# Patient Record
Sex: MALE | Race: WHITE | Employment: OTHER | URBAN - NONMETROPOLITAN AREA
[De-identification: names, ages, dates, MRNs, and addresses within clinical notes are randomized per-mention and may not be internally consistent; named-entity substitution may affect disease eponyms.]

---

## 2017-07-05 ENCOUNTER — DOCTOR'S OFFICE (OUTPATIENT)
Dept: URBAN - NONMETROPOLITAN AREA CLINIC 1 | Facility: CLINIC | Age: 82
Setting detail: OPHTHALMOLOGY
End: 2017-07-05
Payer: COMMERCIAL

## 2017-07-05 DIAGNOSIS — G43.801: ICD-10-CM

## 2017-07-05 DIAGNOSIS — H26.491: ICD-10-CM

## 2017-07-05 DIAGNOSIS — H35.3122: ICD-10-CM

## 2017-07-05 DIAGNOSIS — H27.8: ICD-10-CM

## 2017-07-05 DIAGNOSIS — H52.4: ICD-10-CM

## 2017-07-05 DIAGNOSIS — H02.011: ICD-10-CM

## 2017-07-05 DIAGNOSIS — H35.3212: ICD-10-CM

## 2017-07-05 DIAGNOSIS — H40.003: ICD-10-CM

## 2017-07-05 DIAGNOSIS — H43.813: ICD-10-CM

## 2017-07-05 PROCEDURE — VITAEYE VITA EYE VITAMINS: Performed by: OPHTHALMOLOGY

## 2017-07-05 PROCEDURE — 92014 COMPRE OPH EXAM EST PT 1/>: CPT | Performed by: OPHTHALMOLOGY

## 2017-07-05 PROCEDURE — 92134 CPTRZ OPH DX IMG PST SGM RTA: CPT | Performed by: OPHTHALMOLOGY

## 2017-07-05 ASSESSMENT — VISUAL ACUITY
OD_BCVA: 20/20-2
OS_BCVA: 20/25

## 2017-07-05 ASSESSMENT — REFRACTION_CURRENTRX
OD_OVR_VA: 20/
OD_OVR_VA: 20/
OS_OVR_VA: 20/
OS_OVR_VA: 20/
OD_OVR_VA: 20/
OS_OVR_VA: 20/

## 2017-07-05 ASSESSMENT — REFRACTION_MANIFEST
OD_VA3: 20/
OD_VA2: 20/
OS_VA3: 20/
OS_VA2: 20/
OD_VA1: 20/
OD_VA2: 20/
OD_VA2: 20/
OS_VA1: 20/
OD_VA3: 20/
OU_VA: 20/
OD_VA1: 20/
OS_VA1: 20/
OD_VA3: 20/
OU_VA: 20/
OS_VA3: 20/
OD_VA1: 20/
OS_VA1: 20/
OS_VA2: 20/
OS_VA3: 20/
OS_VA2: 20/
OU_VA: 20/

## 2017-07-05 ASSESSMENT — SPHEQUIV_DERIVED
OD_SPHEQUIV: 0.25
OS_SPHEQUIV: 0.125

## 2017-07-05 ASSESSMENT — LID POSITION - DERMATOCHALASIS
OS_DERMATOCHALASIS: +1
OD_DERMATOCHALASIS: +1

## 2017-07-05 ASSESSMENT — REFRACTION_AUTOREFRACTION
OS_SPHERE: +0.50
OD_CYLINDER: -1.50
OS_CYLINDER: -0.75
OS_AXIS: 71
OD_SPHERE: +1.00
OD_AXIS: 68

## 2017-07-05 ASSESSMENT — CONFRONTATIONAL VISUAL FIELD TEST (CVF)
OS_FINDINGS: FULL
OD_FINDINGS: FULL

## 2017-10-13 ENCOUNTER — DOCTOR'S OFFICE (OUTPATIENT)
Dept: URBAN - NONMETROPOLITAN AREA CLINIC 1 | Facility: CLINIC | Age: 82
Setting detail: OPHTHALMOLOGY
End: 2017-10-13
Payer: COMMERCIAL

## 2017-10-13 DIAGNOSIS — H27.8: ICD-10-CM

## 2017-10-13 DIAGNOSIS — H43.813: ICD-10-CM

## 2017-10-13 DIAGNOSIS — H35.3212: ICD-10-CM

## 2017-10-13 DIAGNOSIS — H35.3122: ICD-10-CM

## 2017-10-13 DIAGNOSIS — G43.801: ICD-10-CM

## 2017-10-13 DIAGNOSIS — H26.491: ICD-10-CM

## 2017-10-13 DIAGNOSIS — H40.003: ICD-10-CM

## 2017-10-13 PROCEDURE — 92014 COMPRE OPH EXAM EST PT 1/>: CPT | Performed by: OPHTHALMOLOGY

## 2017-10-13 PROCEDURE — 92250 FUNDUS PHOTOGRAPHY W/I&R: CPT | Performed by: OPHTHALMOLOGY

## 2017-10-13 PROCEDURE — 92134 CPTRZ OPH DX IMG PST SGM RTA: CPT | Performed by: OPHTHALMOLOGY

## 2017-10-13 ASSESSMENT — REFRACTION_MANIFEST
OU_VA: 20/
OS_VA3: 20/
OD_VA1: 20/
OS_VA1: 20/
OS_VA3: 20/
OU_VA: 20/
OD_VA3: 20/
OS_VA1: 20/
OD_VA1: 20/
OD_VA2: 20/
OU_VA: 20/
OD_VA3: 20/
OD_VA2: 20/
OS_VA2: 20/
OS_VA2: 20/
OS_VA3: 20/
OD_VA2: 20/
OS_VA1: 20/
OD_VA1: 20/
OD_VA3: 20/
OS_VA2: 20/

## 2017-10-13 ASSESSMENT — REFRACTION_CURRENTRX
OD_OVR_VA: 20/
OD_OVR_VA: 20/
OS_OVR_VA: 20/
OD_OVR_VA: 20/
OS_OVR_VA: 20/
OS_OVR_VA: 20/

## 2017-10-13 ASSESSMENT — VISUAL ACUITY
OS_BCVA: 20/70
OD_BCVA: 20/25

## 2017-10-13 ASSESSMENT — REFRACTION_AUTOREFRACTION
OS_SPHERE: +0.50
OD_AXIS: 68
OD_CYLINDER: -1.50
OS_CYLINDER: -0.75
OS_AXIS: 71
OD_SPHERE: +1.00

## 2017-10-13 ASSESSMENT — SPHEQUIV_DERIVED
OD_SPHEQUIV: 0.25
OS_SPHEQUIV: 0.125

## 2017-10-13 ASSESSMENT — LID POSITION - DERMATOCHALASIS
OS_DERMATOCHALASIS: +1
OD_DERMATOCHALASIS: +1

## 2017-10-13 ASSESSMENT — CONFRONTATIONAL VISUAL FIELD TEST (CVF)
OD_FINDINGS: FULL
OS_FINDINGS: FULL

## 2017-10-16 ENCOUNTER — DOCTOR'S OFFICE (OUTPATIENT)
Dept: URBAN - METROPOLITAN AREA CLINIC 136 | Facility: CLINIC | Age: 82
Setting detail: OPHTHALMOLOGY
End: 2017-10-16
Payer: COMMERCIAL

## 2017-10-16 DIAGNOSIS — Z96.1: ICD-10-CM

## 2017-10-16 DIAGNOSIS — H43.813: ICD-10-CM

## 2017-10-16 DIAGNOSIS — H35.3122: ICD-10-CM

## 2017-10-16 DIAGNOSIS — H35.3211: ICD-10-CM

## 2017-10-16 DIAGNOSIS — H40.003: ICD-10-CM

## 2017-10-16 DIAGNOSIS — H26.491: ICD-10-CM

## 2017-10-16 DIAGNOSIS — G43.801: ICD-10-CM

## 2017-10-16 PROCEDURE — 67027 IMPLANT EYE DRUG SYSTEM: CPT | Performed by: OPHTHALMOLOGY

## 2017-10-16 PROCEDURE — 92134 CPTRZ OPH DX IMG PST SGM RTA: CPT | Performed by: OPHTHALMOLOGY

## 2017-10-16 PROCEDURE — 67110 REPAIR DETACHED RETINA: CPT | Performed by: OPHTHALMOLOGY

## 2017-10-16 PROCEDURE — SPECPHARM SPECIALTY PHARMACY DRUG: Performed by: OPHTHALMOLOGY

## 2017-10-16 PROCEDURE — 99214 OFFICE O/P EST MOD 30 MIN: CPT | Performed by: OPHTHALMOLOGY

## 2017-10-16 ASSESSMENT — CONFRONTATIONAL VISUAL FIELD TEST (CVF)
OD_FINDINGS: FULL
OS_FINDINGS: FULL

## 2017-10-16 ASSESSMENT — LID POSITION - DERMATOCHALASIS
OD_DERMATOCHALASIS: +1
OS_DERMATOCHALASIS: +1

## 2017-10-24 ENCOUNTER — RX ONLY (RX ONLY)
Age: 82
End: 2017-10-24

## 2017-10-24 ENCOUNTER — DOCTOR'S OFFICE (OUTPATIENT)
Dept: URBAN - METROPOLITAN AREA CLINIC 136 | Facility: CLINIC | Age: 82
Setting detail: OPHTHALMOLOGY
End: 2017-10-24
Payer: COMMERCIAL

## 2017-10-24 DIAGNOSIS — H35.3211: ICD-10-CM

## 2017-10-24 DIAGNOSIS — H40.003: ICD-10-CM

## 2017-10-24 DIAGNOSIS — H35.3122: ICD-10-CM

## 2017-10-24 DIAGNOSIS — H43.813: ICD-10-CM

## 2017-10-24 DIAGNOSIS — Z96.1: ICD-10-CM

## 2017-10-24 DIAGNOSIS — H26.491: ICD-10-CM

## 2017-10-24 DIAGNOSIS — G43.801: ICD-10-CM

## 2017-10-24 PROCEDURE — 99024 POSTOP FOLLOW-UP VISIT: CPT | Performed by: OPHTHALMOLOGY

## 2017-10-24 PROCEDURE — 92134 CPTRZ OPH DX IMG PST SGM RTA: CPT | Performed by: OPHTHALMOLOGY

## 2017-10-24 PROCEDURE — 92250 FUNDUS PHOTOGRAPHY W/I&R: CPT | Performed by: OPHTHALMOLOGY

## 2017-10-24 ASSESSMENT — REFRACTION_CURRENTRX
OD_OVR_VA: 20/
OS_OVR_VA: 20/
OS_OVR_VA: 20/
OD_OVR_VA: 20/
OD_OVR_VA: 20/
OS_OVR_VA: 20/

## 2017-10-24 ASSESSMENT — SPHEQUIV_DERIVED
OD_SPHEQUIV: 0.25
OS_SPHEQUIV: 0.125

## 2017-10-24 ASSESSMENT — CONFRONTATIONAL VISUAL FIELD TEST (CVF)
OS_FINDINGS: FULL
OD_FINDINGS: FULL

## 2017-10-24 ASSESSMENT — REFRACTION_AUTOREFRACTION
OS_SPHERE: +0.50
OD_CYLINDER: -1.50
OD_AXIS: 68
OS_AXIS: 71
OS_CYLINDER: -0.75
OD_SPHERE: +1.00

## 2017-10-24 ASSESSMENT — REFRACTION_MANIFEST
OS_VA2: 20/
OD_VA1: 20/
OU_VA: 20/
OU_VA: 20/
OS_VA3: 20/
OD_VA3: 20/
OD_VA2: 20/
OD_VA1: 20/
OS_VA2: 20/
OS_VA1: 20/
OS_VA3: 20/
OS_VA2: 20/
OD_VA2: 20/
OS_VA3: 20/
OD_VA3: 20/
OD_VA1: 20/
OD_VA2: 20/
OU_VA: 20/
OD_VA3: 20/
OS_VA1: 20/
OS_VA1: 20/

## 2017-10-24 ASSESSMENT — VISUAL ACUITY
OD_BCVA: 20/22-1
OS_BCVA: 20/300

## 2017-10-24 ASSESSMENT — LID POSITION - DERMATOCHALASIS
OD_DERMATOCHALASIS: +1
OS_DERMATOCHALASIS: +1

## 2017-10-26 ASSESSMENT — REFRACTION_MANIFEST
OD_VA3: 20/
OD_VA2: 20/
OS_VA2: 20/
OD_VA2: 20/
OD_VA1: 20/
OS_VA2: 20/
OD_VA1: 20/
OS_VA3: 20/
OD_VA3: 20/
OS_VA2: 20/
OU_VA: 20/
OU_VA: 20/
OD_VA2: 20/
OS_VA1: 20/
OU_VA: 20/
OS_VA1: 20/
OS_VA1: 20/
OD_VA1: 20/
OS_VA3: 20/
OS_VA3: 20/
OD_VA3: 20/

## 2017-10-26 ASSESSMENT — SPHEQUIV_DERIVED
OD_SPHEQUIV: 0.25
OS_SPHEQUIV: 0.125

## 2017-10-26 ASSESSMENT — REFRACTION_AUTOREFRACTION
OS_SPHERE: +0.50
OS_CYLINDER: -0.75
OD_AXIS: 68
OD_CYLINDER: -1.50
OS_AXIS: 71
OD_SPHERE: +1.00

## 2017-10-26 ASSESSMENT — VISUAL ACUITY
OD_BCVA: 20/40-
OS_BCVA: 20/500

## 2017-10-26 ASSESSMENT — REFRACTION_CURRENTRX
OS_OVR_VA: 20/
OS_OVR_VA: 20/
OD_OVR_VA: 20/
OS_OVR_VA: 20/
OD_OVR_VA: 20/
OD_OVR_VA: 20/

## 2017-10-31 ENCOUNTER — DOCTOR'S OFFICE (OUTPATIENT)
Dept: URBAN - METROPOLITAN AREA CLINIC 136 | Facility: CLINIC | Age: 82
Setting detail: OPHTHALMOLOGY
End: 2017-10-31
Payer: COMMERCIAL

## 2017-10-31 DIAGNOSIS — H35.3211: ICD-10-CM

## 2017-10-31 DIAGNOSIS — H35.3122: ICD-10-CM

## 2017-10-31 DIAGNOSIS — Z96.1: ICD-10-CM

## 2017-10-31 DIAGNOSIS — H26.491: ICD-10-CM

## 2017-10-31 DIAGNOSIS — H43.813: ICD-10-CM

## 2017-10-31 DIAGNOSIS — H40.003: ICD-10-CM

## 2017-10-31 PROCEDURE — 99024 POSTOP FOLLOW-UP VISIT: CPT | Performed by: OPHTHALMOLOGY

## 2017-10-31 PROCEDURE — 92134 CPTRZ OPH DX IMG PST SGM RTA: CPT | Performed by: OPHTHALMOLOGY

## 2017-10-31 ASSESSMENT — REFRACTION_MANIFEST
OU_VA: 20/
OD_VA2: 20/
OU_VA: 20/
OS_VA1: 20/
OD_VA2: 20/
OS_VA1: 20/
OD_VA2: 20/
OU_VA: 20/
OD_VA3: 20/
OD_VA3: 20/
OS_VA2: 20/
OS_VA3: 20/
OD_VA1: 20/
OS_VA2: 20/
OS_VA3: 20/
OD_VA1: 20/
OD_VA3: 20/
OS_VA2: 20/
OS_VA1: 20/
OD_VA1: 20/
OS_VA3: 20/

## 2017-10-31 ASSESSMENT — SPHEQUIV_DERIVED
OD_SPHEQUIV: 0.25
OS_SPHEQUIV: 0.125

## 2017-10-31 ASSESSMENT — REFRACTION_CURRENTRX
OS_OVR_VA: 20/
OS_OVR_VA: 20/
OD_OVR_VA: 20/
OD_OVR_VA: 20/
OS_OVR_VA: 20/
OD_OVR_VA: 20/

## 2017-10-31 ASSESSMENT — VISUAL ACUITY
OS_BCVA: 20/500
OD_BCVA: 20/40

## 2017-10-31 ASSESSMENT — LID POSITION - DERMATOCHALASIS
OD_DERMATOCHALASIS: +1
OS_DERMATOCHALASIS: +1

## 2017-10-31 ASSESSMENT — CONFRONTATIONAL VISUAL FIELD TEST (CVF)
OD_FINDINGS: FULL
OS_FINDINGS: FULL

## 2017-10-31 ASSESSMENT — REFRACTION_AUTOREFRACTION
OD_AXIS: 68
OD_CYLINDER: -1.50
OS_AXIS: 71
OS_SPHERE: +0.50
OD_SPHERE: +1.00
OS_CYLINDER: -0.75

## 2017-10-31 ASSESSMENT — CORNEAL EDEMA CLINICAL DESCRIPTION: OD_CORNEALEDEMA: T

## 2017-11-16 ENCOUNTER — DOCTOR'S OFFICE (OUTPATIENT)
Dept: URBAN - METROPOLITAN AREA CLINIC 136 | Facility: CLINIC | Age: 82
Setting detail: OPHTHALMOLOGY
End: 2017-11-16
Payer: COMMERCIAL

## 2017-11-16 DIAGNOSIS — Z96.1: ICD-10-CM

## 2017-11-16 DIAGNOSIS — H40.003: ICD-10-CM

## 2017-11-16 DIAGNOSIS — H43.813: ICD-10-CM

## 2017-11-16 DIAGNOSIS — H35.3211: ICD-10-CM

## 2017-11-16 DIAGNOSIS — H35.3122: ICD-10-CM

## 2017-11-16 DIAGNOSIS — H26.491: ICD-10-CM

## 2017-11-16 PROCEDURE — 92134 CPTRZ OPH DX IMG PST SGM RTA: CPT | Performed by: OPHTHALMOLOGY

## 2017-11-16 PROCEDURE — 67028 INJECTION EYE DRUG: CPT | Performed by: OPHTHALMOLOGY

## 2017-11-16 PROCEDURE — 99024 POSTOP FOLLOW-UP VISIT: CPT | Performed by: OPHTHALMOLOGY

## 2017-11-16 ASSESSMENT — CONFRONTATIONAL VISUAL FIELD TEST (CVF)
OS_FINDINGS: FULL
OD_FINDINGS: FULL

## 2017-11-16 ASSESSMENT — LID POSITION - DERMATOCHALASIS
OD_DERMATOCHALASIS: +1
OS_DERMATOCHALASIS: +1

## 2017-11-16 ASSESSMENT — CORNEAL EDEMA CLINICAL DESCRIPTION: OD_CORNEALEDEMA: T

## 2017-11-17 ENCOUNTER — RX ONLY (RX ONLY)
Age: 82
End: 2017-11-17

## 2017-11-17 ASSESSMENT — REFRACTION_CURRENTRX
OS_OVR_VA: 20/
OD_OVR_VA: 20/
OS_OVR_VA: 20/
OS_OVR_VA: 20/
OD_OVR_VA: 20/
OD_OVR_VA: 20/

## 2017-11-17 ASSESSMENT — SPHEQUIV_DERIVED
OS_SPHEQUIV: 0.125
OD_SPHEQUIV: 0.25

## 2017-11-17 ASSESSMENT — REFRACTION_MANIFEST
OU_VA: 20/
OD_VA2: 20/
OD_VA3: 20/
OS_VA3: 20/
OS_VA2: 20/
OS_VA1: 20/
OD_VA2: 20/
OS_VA1: 20/
OS_VA2: 20/
OS_VA3: 20/
OD_VA3: 20/
OD_VA1: 20/
OD_VA2: 20/
OS_VA2: 20/
OU_VA: 20/
OD_VA1: 20/
OU_VA: 20/
OD_VA3: 20/
OS_VA3: 20/
OS_VA1: 20/
OD_VA1: 20/

## 2017-11-17 ASSESSMENT — REFRACTION_AUTOREFRACTION
OD_SPHERE: +1.00
OS_CYLINDER: -0.75
OS_AXIS: 71
OD_AXIS: 68
OS_SPHERE: +0.50
OD_CYLINDER: -1.50

## 2017-11-17 ASSESSMENT — VISUAL ACUITY
OS_BCVA: 20/400
OD_BCVA: 20/40

## 2018-01-11 ENCOUNTER — DOCTOR'S OFFICE (OUTPATIENT)
Dept: URBAN - NONMETROPOLITAN AREA CLINIC 1 | Facility: CLINIC | Age: 83
Setting detail: OPHTHALMOLOGY
End: 2018-01-11
Payer: COMMERCIAL

## 2018-01-11 DIAGNOSIS — H43.813: ICD-10-CM

## 2018-01-11 DIAGNOSIS — H43.811: ICD-10-CM

## 2018-01-11 DIAGNOSIS — H43.812: ICD-10-CM

## 2018-01-11 DIAGNOSIS — H35.3122: ICD-10-CM

## 2018-01-11 DIAGNOSIS — H40.003: ICD-10-CM

## 2018-01-11 DIAGNOSIS — H26.491: ICD-10-CM

## 2018-01-11 DIAGNOSIS — Z96.1: ICD-10-CM

## 2018-01-11 DIAGNOSIS — H35.3211: ICD-10-CM

## 2018-01-11 PROCEDURE — 92250 FUNDUS PHOTOGRAPHY W/I&R: CPT | Performed by: OPHTHALMOLOGY

## 2018-01-11 PROCEDURE — 92235 FLUORESCEIN ANGRPH MLTIFRAME: CPT | Performed by: OPHTHALMOLOGY

## 2018-01-11 PROCEDURE — 92014 COMPRE OPH EXAM EST PT 1/>: CPT | Performed by: OPHTHALMOLOGY

## 2018-01-11 PROCEDURE — 92225 OPHTHALMOSCOPY EXTENDED INITIAL: CPT | Performed by: OPHTHALMOLOGY

## 2018-01-11 ASSESSMENT — REFRACTION_MANIFEST
OS_VA1: 20/
OD_VA3: 20/
OD_VA1: 20/
OD_VA1: 20/
OU_VA: 20/
OD_VA1: 20/
OS_VA3: 20/
OS_VA2: 20/
OU_VA: 20/
OS_VA1: 20/
OU_VA: 20/
OD_VA3: 20/
OD_VA2: 20/
OD_VA3: 20/
OS_VA3: 20/
OS_VA2: 20/
OS_VA1: 20/
OD_VA2: 20/
OS_VA3: 20/
OS_VA2: 20/
OD_VA2: 20/

## 2018-01-11 ASSESSMENT — SPHEQUIV_DERIVED
OD_SPHEQUIV: 0.25
OS_SPHEQUIV: 0.125

## 2018-01-11 ASSESSMENT — REFRACTION_CURRENTRX
OD_OVR_VA: 20/
OS_OVR_VA: 20/
OD_OVR_VA: 20/
OD_OVR_VA: 20/

## 2018-01-11 ASSESSMENT — CONFRONTATIONAL VISUAL FIELD TEST (CVF)
OS_FINDINGS: FULL
OD_FINDINGS: FULL

## 2018-01-11 ASSESSMENT — REFRACTION_AUTOREFRACTION
OD_SPHERE: +1.00
OD_AXIS: 68
OD_CYLINDER: -1.50
OS_CYLINDER: -0.75
OS_AXIS: 71
OS_SPHERE: +0.50

## 2018-01-11 ASSESSMENT — VISUAL ACUITY
OD_BCVA: 20/40-2
OS_BCVA: 20/70-2

## 2018-01-11 ASSESSMENT — LID POSITION - DERMATOCHALASIS
OS_DERMATOCHALASIS: +1
OD_DERMATOCHALASIS: +1

## 2018-01-11 ASSESSMENT — CORNEAL EDEMA CLINICAL DESCRIPTION: OD_CORNEALEDEMA: T

## 2018-01-18 ENCOUNTER — DOCTOR'S OFFICE (OUTPATIENT)
Dept: URBAN - NONMETROPOLITAN AREA CLINIC 1 | Facility: CLINIC | Age: 83
Setting detail: OPHTHALMOLOGY
End: 2018-01-18
Payer: COMMERCIAL

## 2018-01-18 DIAGNOSIS — H35.3211: ICD-10-CM

## 2018-01-18 DIAGNOSIS — H35.3122: ICD-10-CM

## 2018-01-18 PROCEDURE — 92240 ICG ANGIOGRAPHY I&R UNI/BI: CPT | Performed by: OPHTHALMOLOGY

## 2018-01-18 PROCEDURE — 67028 INJECTION EYE DRUG: CPT | Performed by: OPHTHALMOLOGY

## 2018-01-18 ASSESSMENT — REFRACTION_AUTOREFRACTION
OS_SPHERE: +0.50
OS_AXIS: 71
OD_AXIS: 68
OD_CYLINDER: -1.50
OS_CYLINDER: -0.75
OD_SPHERE: +1.00

## 2018-01-18 ASSESSMENT — SPHEQUIV_DERIVED
OD_SPHEQUIV: 0.25
OS_SPHEQUIV: 0.125

## 2018-01-18 ASSESSMENT — VISUAL ACUITY
OS_BCVA: 20/70-2
OD_BCVA: 20/40-2

## 2018-02-19 ENCOUNTER — DOCTOR'S OFFICE (OUTPATIENT)
Dept: URBAN - NONMETROPOLITAN AREA CLINIC 1 | Facility: CLINIC | Age: 83
Setting detail: OPHTHALMOLOGY
End: 2018-02-19
Payer: COMMERCIAL

## 2018-02-19 DIAGNOSIS — H35.3211: ICD-10-CM

## 2018-02-19 DIAGNOSIS — H40.003: ICD-10-CM

## 2018-02-19 DIAGNOSIS — H26.491: ICD-10-CM

## 2018-02-19 DIAGNOSIS — H43.811: ICD-10-CM

## 2018-02-19 DIAGNOSIS — H35.3122: ICD-10-CM

## 2018-02-19 DIAGNOSIS — H43.813: ICD-10-CM

## 2018-02-19 DIAGNOSIS — Z96.1: ICD-10-CM

## 2018-02-19 DIAGNOSIS — H43.812: ICD-10-CM

## 2018-02-19 PROCEDURE — 92014 COMPRE OPH EXAM EST PT 1/>: CPT | Performed by: OPHTHALMOLOGY

## 2018-02-19 PROCEDURE — 92134 CPTRZ OPH DX IMG PST SGM RTA: CPT | Performed by: OPHTHALMOLOGY

## 2018-02-19 PROCEDURE — 92226 OPHTHALMOSCOPY EXT SUBSEQUENT: CPT | Performed by: OPHTHALMOLOGY

## 2018-02-19 ASSESSMENT — REFRACTION_MANIFEST
OS_VA1: 20/
OD_VA3: 20/
OD_VA1: 20/
OS_VA3: 20/
OS_VA1: 20/
OS_VA2: 20/
OS_VA3: 20/
OD_VA3: 20/
OS_VA1: 20/
OD_VA1: 20/
OS_VA2: 20/
OS_VA2: 20/
OS_VA3: 20/
OD_VA2: 20/
OD_VA2: 20/
OU_VA: 20/
OD_VA2: 20/
OU_VA: 20/
OD_VA1: 20/
OD_VA3: 20/
OU_VA: 20/

## 2018-02-19 ASSESSMENT — LID POSITION - DERMATOCHALASIS
OD_DERMATOCHALASIS: +1
OS_DERMATOCHALASIS: +1

## 2018-02-19 ASSESSMENT — REFRACTION_CURRENTRX
OD_OVR_VA: 20/
OS_OVR_VA: 20/

## 2018-02-19 ASSESSMENT — SPHEQUIV_DERIVED
OD_SPHEQUIV: 0.25
OS_SPHEQUIV: 0.125

## 2018-02-19 ASSESSMENT — REFRACTION_AUTOREFRACTION
OD_SPHERE: +1.00
OD_AXIS: 68
OD_CYLINDER: -1.50
OS_SPHERE: +0.50
OS_CYLINDER: -0.75
OS_AXIS: 71

## 2018-02-19 ASSESSMENT — VISUAL ACUITY
OS_BCVA: 20/70-1
OD_BCVA: 20/25-2

## 2018-02-19 ASSESSMENT — CORNEAL EDEMA CLINICAL DESCRIPTION: OD_CORNEALEDEMA: T

## 2018-02-19 ASSESSMENT — CONFRONTATIONAL VISUAL FIELD TEST (CVF)
OS_FINDINGS: FULL
OD_FINDINGS: FULL

## 2018-02-27 ENCOUNTER — DOCTOR'S OFFICE (OUTPATIENT)
Dept: URBAN - NONMETROPOLITAN AREA CLINIC 1 | Facility: CLINIC | Age: 83
Setting detail: OPHTHALMOLOGY
End: 2018-02-27
Payer: COMMERCIAL

## 2018-02-27 DIAGNOSIS — H35.3211: ICD-10-CM

## 2018-02-27 PROCEDURE — 67028 INJECTION EYE DRUG: CPT | Performed by: OPHTHALMOLOGY

## 2018-03-09 ASSESSMENT — SPHEQUIV_DERIVED
OS_SPHEQUIV: 0.125
OD_SPHEQUIV: 0.25

## 2018-03-09 ASSESSMENT — REFRACTION_AUTOREFRACTION
OD_SPHERE: +1.00
OS_AXIS: 71
OD_AXIS: 68
OS_CYLINDER: -0.75
OD_CYLINDER: -1.50
OS_SPHERE: +0.50

## 2018-03-09 ASSESSMENT — VISUAL ACUITY
OS_BCVA: 20/70-1
OD_BCVA: 20/25-2

## 2018-03-26 ENCOUNTER — DOCTOR'S OFFICE (OUTPATIENT)
Dept: URBAN - NONMETROPOLITAN AREA CLINIC 1 | Facility: CLINIC | Age: 83
Setting detail: OPHTHALMOLOGY
End: 2018-03-26
Payer: COMMERCIAL

## 2018-03-26 DIAGNOSIS — H35.3122: ICD-10-CM

## 2018-03-26 DIAGNOSIS — H43.812: ICD-10-CM

## 2018-03-26 DIAGNOSIS — H43.811: ICD-10-CM

## 2018-03-26 DIAGNOSIS — H43.813: ICD-10-CM

## 2018-03-26 DIAGNOSIS — H40.003: ICD-10-CM

## 2018-03-26 DIAGNOSIS — H35.3211: ICD-10-CM

## 2018-03-26 PROCEDURE — 92014 COMPRE OPH EXAM EST PT 1/>: CPT | Performed by: OPHTHALMOLOGY

## 2018-03-26 PROCEDURE — 92134 CPTRZ OPH DX IMG PST SGM RTA: CPT | Performed by: OPHTHALMOLOGY

## 2018-03-26 PROCEDURE — 92226 OPHTHALMOSCOPY EXT SUBSEQUENT: CPT | Performed by: OPHTHALMOLOGY

## 2018-03-26 ASSESSMENT — REFRACTION_MANIFEST
OD_VA2: 20/
OS_VA1: 20/
OS_VA2: 20/
OD_VA1: 20/
OS_VA2: 20/
OD_VA2: 20/
OD_VA1: 20/
OS_VA2: 20/
OD_VA3: 20/
OS_VA1: 20/
OS_VA3: 20/
OD_VA2: 20/
OD_VA1: 20/
OU_VA: 20/
OD_VA3: 20/
OU_VA: 20/
OU_VA: 20/
OS_VA3: 20/
OS_VA3: 20/
OD_VA3: 20/
OS_VA1: 20/

## 2018-03-26 ASSESSMENT — REFRACTION_CURRENTRX
OD_OVR_VA: 20/
OS_OVR_VA: 20/
OD_OVR_VA: 20/
OD_OVR_VA: 20/
OS_OVR_VA: 20/
OS_OVR_VA: 20/

## 2018-03-26 ASSESSMENT — VISUAL ACUITY
OS_BCVA: 20/60+1
OD_BCVA: 20/20-2

## 2018-03-26 ASSESSMENT — REFRACTION_AUTOREFRACTION
OD_SPHERE: +1.00
OS_CYLINDER: -0.75
OS_SPHERE: +0.50
OS_AXIS: 71
OD_AXIS: 68
OD_CYLINDER: -1.50

## 2018-03-26 ASSESSMENT — CONFRONTATIONAL VISUAL FIELD TEST (CVF)
OD_FINDINGS: FULL
OS_FINDINGS: FULL

## 2018-03-26 ASSESSMENT — LID POSITION - DERMATOCHALASIS
OD_DERMATOCHALASIS: +1
OS_DERMATOCHALASIS: +1

## 2018-03-26 ASSESSMENT — SPHEQUIV_DERIVED
OD_SPHEQUIV: 0.25
OS_SPHEQUIV: 0.125

## 2018-03-26 ASSESSMENT — CORNEAL EDEMA CLINICAL DESCRIPTION: OD_CORNEALEDEMA: T

## 2018-04-05 ENCOUNTER — DOCTOR'S OFFICE (OUTPATIENT)
Dept: URBAN - NONMETROPOLITAN AREA CLINIC 1 | Facility: CLINIC | Age: 83
Setting detail: OPHTHALMOLOGY
End: 2018-04-05
Payer: COMMERCIAL

## 2018-04-05 DIAGNOSIS — H35.3211: ICD-10-CM

## 2018-04-05 PROCEDURE — 67028 INJECTION EYE DRUG: CPT | Performed by: OPHTHALMOLOGY

## 2018-04-06 ASSESSMENT — SPHEQUIV_DERIVED
OD_SPHEQUIV: 0.25
OS_SPHEQUIV: 0.125

## 2018-04-06 ASSESSMENT — REFRACTION_AUTOREFRACTION
OS_CYLINDER: -0.75
OD_SPHERE: +1.00
OD_AXIS: 68
OS_SPHERE: +0.50
OD_CYLINDER: -1.50
OS_AXIS: 71

## 2018-04-06 ASSESSMENT — VISUAL ACUITY
OS_BCVA: 20/60+1
OD_BCVA: 20/20-2

## 2018-04-23 ENCOUNTER — DOCTOR'S OFFICE (OUTPATIENT)
Dept: URBAN - NONMETROPOLITAN AREA CLINIC 1 | Facility: CLINIC | Age: 83
Setting detail: OPHTHALMOLOGY
End: 2018-04-23
Payer: COMMERCIAL

## 2018-04-23 DIAGNOSIS — H43.811: ICD-10-CM

## 2018-04-23 DIAGNOSIS — H35.3211: ICD-10-CM

## 2018-04-23 DIAGNOSIS — H43.812: ICD-10-CM

## 2018-04-23 DIAGNOSIS — H35.3122: ICD-10-CM

## 2018-04-23 PROCEDURE — 99213 OFFICE O/P EST LOW 20 MIN: CPT | Performed by: OPHTHALMOLOGY

## 2018-04-23 PROCEDURE — 92235 FLUORESCEIN ANGRPH MLTIFRAME: CPT | Performed by: OPHTHALMOLOGY

## 2018-04-23 PROCEDURE — 92226 OPHTHALMOSCOPY EXT SUBSEQUENT: CPT | Performed by: OPHTHALMOLOGY

## 2018-04-23 PROCEDURE — 92250 FUNDUS PHOTOGRAPHY W/I&R: CPT | Performed by: OPHTHALMOLOGY

## 2018-04-23 ASSESSMENT — REFRACTION_CURRENTRX
OD_OVR_VA: 20/
OD_OVR_VA: 20/
OS_OVR_VA: 20/
OD_OVR_VA: 20/
OS_OVR_VA: 20/
OS_OVR_VA: 20/

## 2018-04-23 ASSESSMENT — CONFRONTATIONAL VISUAL FIELD TEST (CVF)
OS_FINDINGS: FULL
OD_FINDINGS: FULL

## 2018-04-23 ASSESSMENT — REFRACTION_MANIFEST
OS_VA1: 20/
OD_VA2: 20/
OS_VA2: 20/
OS_VA3: 20/
OD_VA1: 20/
OS_VA2: 20/
OS_VA3: 20/
OS_VA2: 20/
OS_VA1: 20/
OU_VA: 20/
OD_VA3: 20/
OD_VA1: 20/
OD_VA1: 20/
OS_VA1: 20/
OS_VA3: 20/
OU_VA: 20/
OD_VA3: 20/
OD_VA2: 20/
OD_VA3: 20/
OU_VA: 20/
OD_VA2: 20/

## 2018-04-23 ASSESSMENT — VISUAL ACUITY
OS_BCVA: 20/80
OD_BCVA: 20/25-2

## 2018-04-23 ASSESSMENT — REFRACTION_AUTOREFRACTION
OS_AXIS: 71
OD_CYLINDER: -1.50
OS_CYLINDER: -0.75
OS_SPHERE: +0.50
OD_SPHERE: +1.00
OD_AXIS: 68

## 2018-04-23 ASSESSMENT — CORNEAL EDEMA CLINICAL DESCRIPTION: OD_CORNEALEDEMA: T

## 2018-04-23 ASSESSMENT — SPHEQUIV_DERIVED
OD_SPHEQUIV: 0.25
OS_SPHEQUIV: 0.125

## 2018-04-23 ASSESSMENT — LID POSITION - DERMATOCHALASIS
OS_DERMATOCHALASIS: +1
OD_DERMATOCHALASIS: +1

## 2018-05-07 ENCOUNTER — DOCTOR'S OFFICE (OUTPATIENT)
Dept: URBAN - NONMETROPOLITAN AREA CLINIC 1 | Facility: CLINIC | Age: 83
Setting detail: OPHTHALMOLOGY
End: 2018-05-07
Payer: COMMERCIAL

## 2018-05-07 DIAGNOSIS — H35.3211: ICD-10-CM

## 2018-05-07 PROCEDURE — 67028 INJECTION EYE DRUG: CPT | Performed by: OPHTHALMOLOGY

## 2018-05-08 ASSESSMENT — REFRACTION_AUTOREFRACTION
OD_SPHERE: +1.00
OD_CYLINDER: -1.50
OS_AXIS: 71
OS_SPHERE: +0.50
OS_CYLINDER: -0.75
OD_AXIS: 68

## 2018-05-08 ASSESSMENT — VISUAL ACUITY
OS_BCVA: 20/80
OD_BCVA: 20/25-2

## 2018-05-08 ASSESSMENT — SPHEQUIV_DERIVED
OD_SPHEQUIV: 0.25
OS_SPHEQUIV: 0.125

## 2018-05-24 ENCOUNTER — DOCTOR'S OFFICE (OUTPATIENT)
Dept: URBAN - NONMETROPOLITAN AREA CLINIC 1 | Facility: CLINIC | Age: 83
Setting detail: OPHTHALMOLOGY
End: 2018-05-24
Payer: COMMERCIAL

## 2018-05-24 DIAGNOSIS — H43.812: ICD-10-CM

## 2018-05-24 DIAGNOSIS — H40.053: ICD-10-CM

## 2018-05-24 DIAGNOSIS — H43.813: ICD-10-CM

## 2018-05-24 DIAGNOSIS — H35.3211: ICD-10-CM

## 2018-05-24 DIAGNOSIS — H43.811: ICD-10-CM

## 2018-05-24 DIAGNOSIS — H35.3122: ICD-10-CM

## 2018-05-24 PROCEDURE — 92014 COMPRE OPH EXAM EST PT 1/>: CPT | Performed by: OPHTHALMOLOGY

## 2018-05-24 PROCEDURE — 92134 CPTRZ OPH DX IMG PST SGM RTA: CPT | Performed by: OPHTHALMOLOGY

## 2018-05-24 PROCEDURE — 92226 OPHTHALMOSCOPY EXT SUBSEQUENT: CPT | Performed by: OPHTHALMOLOGY

## 2018-05-24 ASSESSMENT — REFRACTION_MANIFEST
OD_VA2: 20/
OS_VA2: 20/
OD_VA2: 20/
OS_VA1: 20/
OD_VA2: 20/
OS_VA3: 20/
OD_VA1: 20/
OD_VA1: 20/
OU_VA: 20/
OS_VA2: 20/
OD_VA3: 20/
OD_VA3: 20/
OS_VA1: 20/
OS_VA3: 20/
OU_VA: 20/
OU_VA: 20/
OS_VA3: 20/
OD_VA1: 20/
OD_VA3: 20/
OS_VA1: 20/
OS_VA2: 20/

## 2018-05-24 ASSESSMENT — REFRACTION_AUTOREFRACTION
OD_CYLINDER: -1.50
OD_AXIS: 68
OS_CYLINDER: -0.75
OD_SPHERE: +1.00
OS_AXIS: 71
OS_SPHERE: +0.50

## 2018-05-24 ASSESSMENT — REFRACTION_CURRENTRX
OS_OVR_VA: 20/
OS_OVR_VA: 20/
OD_OVR_VA: 20/
OS_OVR_VA: 20/

## 2018-05-24 ASSESSMENT — CORNEAL EDEMA CLINICAL DESCRIPTION: OD_CORNEALEDEMA: T

## 2018-05-24 ASSESSMENT — VISUAL ACUITY
OD_BCVA: 20/40
OS_BCVA: 20/80

## 2018-05-24 ASSESSMENT — LID POSITION - DERMATOCHALASIS
OS_DERMATOCHALASIS: +1
OD_DERMATOCHALASIS: +1

## 2018-05-24 ASSESSMENT — CONFRONTATIONAL VISUAL FIELD TEST (CVF)
OD_FINDINGS: FULL
OS_FINDINGS: FULL

## 2018-05-24 ASSESSMENT — SPHEQUIV_DERIVED
OS_SPHEQUIV: 0.125
OD_SPHEQUIV: 0.25

## 2018-06-11 ENCOUNTER — DOCTOR'S OFFICE (OUTPATIENT)
Dept: URBAN - NONMETROPOLITAN AREA CLINIC 1 | Facility: CLINIC | Age: 83
Setting detail: OPHTHALMOLOGY
End: 2018-06-11
Payer: COMMERCIAL

## 2018-06-11 DIAGNOSIS — H35.3211: ICD-10-CM

## 2018-06-11 DIAGNOSIS — H35.3122: ICD-10-CM

## 2018-06-11 PROCEDURE — 92240 ICG ANGIOGRAPHY I&R UNI/BI: CPT | Performed by: OPHTHALMOLOGY

## 2018-06-11 PROCEDURE — 92014 COMPRE OPH EXAM EST PT 1/>: CPT | Performed by: OPHTHALMOLOGY

## 2018-06-11 ASSESSMENT — REFRACTION_CURRENTRX
OD_OVR_VA: 20/
OS_OVR_VA: 20/
OD_OVR_VA: 20/
OS_OVR_VA: 20/
OS_OVR_VA: 20/
OD_OVR_VA: 20/

## 2018-06-11 ASSESSMENT — REFRACTION_MANIFEST
OD_VA2: 20/
OD_VA2: 20/
OS_VA3: 20/
OU_VA: 20/
OU_VA: 20/
OS_VA2: 20/
OS_VA2: 20/
OD_VA1: 20/
OU_VA: 20/
OD_VA3: 20/
OD_VA1: 20/
OD_VA1: 20/
OS_VA2: 20/
OD_VA3: 20/
OS_VA1: 20/
OS_VA3: 20/
OD_VA3: 20/
OD_VA2: 20/
OS_VA3: 20/
OS_VA1: 20/
OS_VA1: 20/

## 2018-06-11 ASSESSMENT — REFRACTION_AUTOREFRACTION
OS_AXIS: 71
OS_SPHERE: +0.50
OD_SPHERE: +1.00
OS_CYLINDER: -0.75
OD_AXIS: 68
OD_CYLINDER: -1.50

## 2018-06-11 ASSESSMENT — CONFRONTATIONAL VISUAL FIELD TEST (CVF)
OS_FINDINGS: FULL
OD_FINDINGS: FULL

## 2018-06-11 ASSESSMENT — SPHEQUIV_DERIVED
OD_SPHEQUIV: 0.25
OS_SPHEQUIV: 0.125

## 2018-06-11 ASSESSMENT — VISUAL ACUITY
OD_BCVA: 20/30
OS_BCVA: 20/60-2

## 2018-06-11 ASSESSMENT — LID POSITION - DERMATOCHALASIS
OS_DERMATOCHALASIS: +1
OD_DERMATOCHALASIS: +1

## 2018-06-11 ASSESSMENT — CORNEAL EDEMA CLINICAL DESCRIPTION: OD_CORNEALEDEMA: T

## 2018-06-18 ENCOUNTER — DOCTOR'S OFFICE (OUTPATIENT)
Dept: URBAN - NONMETROPOLITAN AREA CLINIC 1 | Facility: CLINIC | Age: 83
Setting detail: OPHTHALMOLOGY
End: 2018-06-18
Payer: COMMERCIAL

## 2018-06-18 DIAGNOSIS — H35.3211: ICD-10-CM

## 2018-06-18 PROCEDURE — 67028 INJECTION EYE DRUG: CPT | Performed by: OPHTHALMOLOGY

## 2018-06-21 ASSESSMENT — VISUAL ACUITY
OD_BCVA: 20/30
OS_BCVA: 20/60-2

## 2018-06-21 ASSESSMENT — REFRACTION_AUTOREFRACTION
OS_AXIS: 71
OD_CYLINDER: -1.50
OD_SPHERE: +1.00
OS_SPHERE: +0.50
OD_AXIS: 68
OS_CYLINDER: -0.75

## 2018-06-21 ASSESSMENT — SPHEQUIV_DERIVED
OS_SPHEQUIV: 0.125
OD_SPHEQUIV: 0.25

## 2018-10-08 ENCOUNTER — DOCTOR'S OFFICE (OUTPATIENT)
Dept: URBAN - NONMETROPOLITAN AREA CLINIC 1 | Facility: CLINIC | Age: 83
Setting detail: OPHTHALMOLOGY
End: 2018-10-08
Payer: COMMERCIAL

## 2018-10-08 DIAGNOSIS — H43.813: ICD-10-CM

## 2018-10-08 DIAGNOSIS — H43.811: ICD-10-CM

## 2018-10-08 DIAGNOSIS — H40.053: ICD-10-CM

## 2018-10-08 DIAGNOSIS — H43.812: ICD-10-CM

## 2018-10-08 DIAGNOSIS — H35.3122: ICD-10-CM

## 2018-10-08 DIAGNOSIS — H35.3211: ICD-10-CM

## 2018-10-08 PROCEDURE — 92134 CPTRZ OPH DX IMG PST SGM RTA: CPT | Performed by: OPHTHALMOLOGY

## 2018-10-08 PROCEDURE — 92014 COMPRE OPH EXAM EST PT 1/>: CPT | Performed by: OPHTHALMOLOGY

## 2018-10-08 PROCEDURE — 92226 OPHTHALMOSCOPY EXT SUBSEQUENT: CPT | Performed by: OPHTHALMOLOGY

## 2018-10-08 ASSESSMENT — REFRACTION_MANIFEST
OS_VA1: 20/
OD_VA1: 20/
OS_VA3: 20/
OS_VA1: 20/
OU_VA: 20/
OS_VA2: 20/
OD_VA3: 20/
OD_VA1: 20/
OD_VA3: 20/
OS_VA3: 20/
OD_VA2: 20/
OS_VA2: 20/
OU_VA: 20/
OD_VA2: 20/

## 2018-10-08 ASSESSMENT — REFRACTION_CURRENTRX
OS_OVR_VA: 20/
OS_OVR_VA: 20/
OD_OVR_VA: 20/
OS_OVR_VA: 20/
OD_OVR_VA: 20/
OD_OVR_VA: 20/

## 2018-10-08 ASSESSMENT — LID POSITION - DERMATOCHALASIS
OS_DERMATOCHALASIS: +1
OD_DERMATOCHALASIS: +1

## 2018-10-08 ASSESSMENT — CORNEAL EDEMA CLINICAL DESCRIPTION: OD_CORNEALEDEMA: T

## 2018-10-08 ASSESSMENT — CONFRONTATIONAL VISUAL FIELD TEST (CVF)
OD_FINDINGS: FULL
OS_FINDINGS: FULL

## 2018-10-08 ASSESSMENT — REFRACTION_AUTOREFRACTION
OD_CYLINDER: -1.50
OS_SPHERE: +0.50
OS_AXIS: 71
OS_CYLINDER: -0.75
OD_SPHERE: +1.00
OD_AXIS: 68

## 2018-10-08 ASSESSMENT — VISUAL ACUITY
OS_BCVA: 20/60+1
OD_BCVA: 20/25-2

## 2018-10-08 ASSESSMENT — SPHEQUIV_DERIVED
OS_SPHEQUIV: 0.125
OD_SPHEQUIV: 0.25

## 2018-10-19 ENCOUNTER — DOCTOR'S OFFICE (OUTPATIENT)
Dept: URBAN - NONMETROPOLITAN AREA CLINIC 1 | Facility: CLINIC | Age: 83
Setting detail: OPHTHALMOLOGY
End: 2018-10-19
Payer: COMMERCIAL

## 2018-10-19 DIAGNOSIS — H43.813: ICD-10-CM

## 2018-10-19 DIAGNOSIS — H35.3122: ICD-10-CM

## 2018-10-19 DIAGNOSIS — H40.053: ICD-10-CM

## 2018-10-19 DIAGNOSIS — H35.3211: ICD-10-CM

## 2018-10-19 PROCEDURE — 92250 FUNDUS PHOTOGRAPHY W/I&R: CPT | Performed by: OPHTHALMOLOGY

## 2018-10-19 PROCEDURE — 92014 COMPRE OPH EXAM EST PT 1/>: CPT | Performed by: OPHTHALMOLOGY

## 2018-10-19 PROCEDURE — 92235 FLUORESCEIN ANGRPH MLTIFRAME: CPT | Performed by: OPHTHALMOLOGY

## 2018-10-19 ASSESSMENT — VISUAL ACUITY
OD_BCVA: 20/25-2
OS_BCVA: 20/60+1

## 2018-10-19 ASSESSMENT — CONFRONTATIONAL VISUAL FIELD TEST (CVF)
OD_FINDINGS: FULL
OS_FINDINGS: FULL

## 2018-10-19 ASSESSMENT — REFRACTION_MANIFEST
OU_VA: 20/
OU_VA: 20/
OS_VA1: 20/
OS_VA2: 20/
OD_VA1: 20/
OS_VA2: 20/
OD_VA2: 20/
OS_VA3: 20/
OD_VA1: 20/
OD_VA3: 20/
OS_VA1: 20/
OD_VA3: 20/
OS_VA3: 20/
OD_VA2: 20/

## 2018-10-19 ASSESSMENT — REFRACTION_AUTOREFRACTION
OD_AXIS: 68
OD_CYLINDER: -1.50
OS_CYLINDER: -0.75
OD_SPHERE: +1.00
OS_SPHERE: +0.50
OS_AXIS: 71

## 2018-10-19 ASSESSMENT — SPHEQUIV_DERIVED
OS_SPHEQUIV: 0.125
OD_SPHEQUIV: 0.25

## 2018-10-19 ASSESSMENT — REFRACTION_CURRENTRX
OD_OVR_VA: 20/
OS_OVR_VA: 20/
OD_OVR_VA: 20/
OS_OVR_VA: 20/
OS_OVR_VA: 20/
OD_OVR_VA: 20/

## 2018-10-19 ASSESSMENT — LID POSITION - DERMATOCHALASIS
OD_DERMATOCHALASIS: +1
OS_DERMATOCHALASIS: +1

## 2018-10-19 ASSESSMENT — CORNEAL EDEMA CLINICAL DESCRIPTION: OD_CORNEALEDEMA: T

## 2018-10-22 ENCOUNTER — DOCTOR'S OFFICE (OUTPATIENT)
Dept: URBAN - NONMETROPOLITAN AREA CLINIC 1 | Facility: CLINIC | Age: 83
Setting detail: OPHTHALMOLOGY
End: 2018-10-22
Payer: COMMERCIAL

## 2018-10-22 DIAGNOSIS — H35.3211: ICD-10-CM

## 2018-10-22 PROCEDURE — 67028 INJECTION EYE DRUG: CPT | Performed by: OPHTHALMOLOGY

## 2018-10-22 ASSESSMENT — REFRACTION_MANIFEST
OS_VA2: 20/
OD_VA2: 20/
OD_VA2: 20/
OU_VA: 20/
OS_VA3: 20/
OS_VA2: 20/
OD_VA1: 20/
OS_VA1: 20/
OS_VA3: 20/
OD_VA1: 20/
OU_VA: 20/
OD_VA3: 20/
OD_VA3: 20/
OS_VA1: 20/

## 2018-10-22 ASSESSMENT — REFRACTION_AUTOREFRACTION
OD_SPHERE: +1.00
OD_CYLINDER: -1.50
OS_CYLINDER: -0.75
OS_AXIS: 71
OD_AXIS: 68
OS_SPHERE: +0.50

## 2018-10-22 ASSESSMENT — VISUAL ACUITY
OD_BCVA: 20/25-2
OS_BCVA: 20/60+1

## 2018-10-22 ASSESSMENT — REFRACTION_CURRENTRX
OS_OVR_VA: 20/
OD_OVR_VA: 20/
OD_OVR_VA: 20/
OS_OVR_VA: 20/
OS_OVR_VA: 20/
OD_OVR_VA: 20/

## 2018-10-22 ASSESSMENT — SPHEQUIV_DERIVED
OS_SPHEQUIV: 0.125
OD_SPHEQUIV: 0.25

## 2019-03-18 ENCOUNTER — DOCTOR'S OFFICE (OUTPATIENT)
Dept: URBAN - NONMETROPOLITAN AREA CLINIC 1 | Facility: CLINIC | Age: 84
Setting detail: OPHTHALMOLOGY
End: 2019-03-18
Payer: COMMERCIAL

## 2019-03-18 DIAGNOSIS — H43.813: ICD-10-CM

## 2019-03-18 DIAGNOSIS — H35.3122: ICD-10-CM

## 2019-03-18 DIAGNOSIS — H40.053: ICD-10-CM

## 2019-03-18 DIAGNOSIS — H43.812: ICD-10-CM

## 2019-03-18 DIAGNOSIS — H43.811: ICD-10-CM

## 2019-03-18 DIAGNOSIS — H35.3211: ICD-10-CM

## 2019-03-18 PROCEDURE — 92226 OPHTHALMOSCOPY EXT SUBSEQUENT: CPT | Performed by: OPHTHALMOLOGY

## 2019-03-18 PROCEDURE — 92134 CPTRZ OPH DX IMG PST SGM RTA: CPT | Performed by: OPHTHALMOLOGY

## 2019-03-18 PROCEDURE — 83861 MICROFLUID ANALY TEARS: CPT | Performed by: OPHTHALMOLOGY

## 2019-03-18 PROCEDURE — 92014 COMPRE OPH EXAM EST PT 1/>: CPT | Performed by: OPHTHALMOLOGY

## 2019-03-18 ASSESSMENT — REFRACTION_MANIFEST
OD_VA1: 20/
OD_VA3: 20/
OD_VA3: 20/
OS_VA3: 20/
OS_VA1: 20/
OU_VA: 20/
OS_VA3: 20/
OD_VA2: 20/
OS_VA1: 20/
OS_VA2: 20/
OS_VA2: 20/
OD_VA2: 20/
OD_VA1: 20/
OU_VA: 20/

## 2019-03-18 ASSESSMENT — SPHEQUIV_DERIVED
OD_SPHEQUIV: 0.25
OS_SPHEQUIV: 0.125

## 2019-03-18 ASSESSMENT — REFRACTION_AUTOREFRACTION
OS_CYLINDER: -0.75
OS_SPHERE: +0.50
OD_CYLINDER: -1.50
OS_AXIS: 71
OD_AXIS: 68
OD_SPHERE: +1.00

## 2019-03-18 ASSESSMENT — VISUAL ACUITY
OS_BCVA: 20/100-1
OD_BCVA: 20/25-1

## 2019-03-18 ASSESSMENT — REFRACTION_CURRENTRX
OD_OVR_VA: 20/
OS_OVR_VA: 20/
OS_OVR_VA: 20/
OD_OVR_VA: 20/
OD_OVR_VA: 20/
OS_OVR_VA: 20/

## 2019-03-18 ASSESSMENT — LID POSITION - DERMATOCHALASIS
OD_DERMATOCHALASIS: +1
OS_DERMATOCHALASIS: +1

## 2019-03-18 ASSESSMENT — CORNEAL EDEMA CLINICAL DESCRIPTION: OD_CORNEALEDEMA: T

## 2019-03-18 ASSESSMENT — CONFRONTATIONAL VISUAL FIELD TEST (CVF)
OD_FINDINGS: FULL
OS_FINDINGS: FULL

## 2019-04-01 ENCOUNTER — DOCTOR'S OFFICE (OUTPATIENT)
Dept: URBAN - NONMETROPOLITAN AREA CLINIC 1 | Facility: CLINIC | Age: 84
Setting detail: OPHTHALMOLOGY
End: 2019-04-01
Payer: COMMERCIAL

## 2019-04-01 DIAGNOSIS — H35.3211: ICD-10-CM

## 2019-04-01 PROCEDURE — 92250 FUNDUS PHOTOGRAPHY W/I&R: CPT | Performed by: OPHTHALMOLOGY

## 2019-04-01 PROCEDURE — 67028 INJECTION EYE DRUG: CPT | Performed by: OPHTHALMOLOGY

## 2019-04-01 PROCEDURE — 92235 FLUORESCEIN ANGRPH MLTIFRAME: CPT | Performed by: OPHTHALMOLOGY

## 2019-04-01 ASSESSMENT — REFRACTION_CURRENTRX
OD_OVR_VA: 20/
OS_OVR_VA: 20/
OD_OVR_VA: 20/
OD_OVR_VA: 20/
OS_OVR_VA: 20/
OS_OVR_VA: 20/

## 2019-04-01 ASSESSMENT — REFRACTION_AUTOREFRACTION
OS_SPHERE: +0.50
OS_AXIS: 71
OD_SPHERE: +1.00
OS_CYLINDER: -0.75
OD_CYLINDER: -1.50
OD_AXIS: 68

## 2019-04-01 ASSESSMENT — CONFRONTATIONAL VISUAL FIELD TEST (CVF)
OD_FINDINGS: FULL
OS_FINDINGS: FULL

## 2019-04-01 ASSESSMENT — REFRACTION_MANIFEST
OD_VA1: 20/
OD_VA3: 20/
OS_VA1: 20/
OS_VA1: 20/
OD_VA1: 20/
OD_VA2: 20/
OS_VA2: 20/
OD_VA2: 20/
OU_VA: 20/
OD_VA3: 20/
OS_VA3: 20/
OS_VA2: 20/
OS_VA3: 20/
OU_VA: 20/

## 2019-04-01 ASSESSMENT — LID POSITION - DERMATOCHALASIS
OD_DERMATOCHALASIS: +1
OS_DERMATOCHALASIS: +1

## 2019-04-01 ASSESSMENT — SPHEQUIV_DERIVED
OD_SPHEQUIV: 0.25
OS_SPHEQUIV: 0.125

## 2019-04-01 ASSESSMENT — CORNEAL EDEMA CLINICAL DESCRIPTION: OD_CORNEALEDEMA: T

## 2019-04-01 ASSESSMENT — VISUAL ACUITY
OS_BCVA: 20/100-1
OD_BCVA: 20/25-1

## 2019-04-04 ENCOUNTER — DOCTOR'S OFFICE (OUTPATIENT)
Dept: URBAN - NONMETROPOLITAN AREA CLINIC 1 | Facility: CLINIC | Age: 84
Setting detail: OPHTHALMOLOGY
End: 2019-04-04
Payer: COMMERCIAL

## 2019-04-04 DIAGNOSIS — H40.053: ICD-10-CM

## 2019-04-04 DIAGNOSIS — H43.813: ICD-10-CM

## 2019-04-04 DIAGNOSIS — H35.3122: ICD-10-CM

## 2019-04-04 DIAGNOSIS — H35.3211: ICD-10-CM

## 2019-04-04 PROCEDURE — 67220 TREATMENT OF CHOROID LESION: CPT | Performed by: OPHTHALMOLOGY

## 2019-04-04 PROCEDURE — 92250 FUNDUS PHOTOGRAPHY W/I&R: CPT | Performed by: OPHTHALMOLOGY

## 2019-04-04 PROCEDURE — 92014 COMPRE OPH EXAM EST PT 1/>: CPT | Performed by: OPHTHALMOLOGY

## 2019-04-04 PROCEDURE — 92240 ICG ANGIOGRAPHY I&R UNI/BI: CPT | Performed by: OPHTHALMOLOGY

## 2019-04-04 ASSESSMENT — REFRACTION_CURRENTRX
OD_OVR_VA: 20/
OS_OVR_VA: 20/
OD_OVR_VA: 20/
OS_OVR_VA: 20/
OS_OVR_VA: 20/
OD_OVR_VA: 20/

## 2019-04-04 ASSESSMENT — REFRACTION_MANIFEST
OS_VA3: 20/
OD_VA1: 20/
OS_VA2: 20/
OS_VA1: 20/
OS_VA1: 20/
OD_VA2: 20/
OS_VA3: 20/
OD_VA3: 20/
OS_VA2: 20/
OD_VA1: 20/
OU_VA: 20/
OD_VA2: 20/
OD_VA3: 20/
OU_VA: 20/

## 2019-04-04 ASSESSMENT — LID POSITION - DERMATOCHALASIS
OS_DERMATOCHALASIS: +1
OD_DERMATOCHALASIS: +1

## 2019-04-04 ASSESSMENT — CORNEAL EDEMA CLINICAL DESCRIPTION: OD_CORNEALEDEMA: T

## 2019-04-04 ASSESSMENT — REFRACTION_AUTOREFRACTION
OD_SPHERE: +1.00
OS_AXIS: 71
OS_CYLINDER: -0.75
OD_AXIS: 68
OD_CYLINDER: -1.50
OS_SPHERE: +0.50

## 2019-04-04 ASSESSMENT — SPHEQUIV_DERIVED
OS_SPHEQUIV: 0.125
OD_SPHEQUIV: 0.25

## 2019-04-04 ASSESSMENT — VISUAL ACUITY
OD_BCVA: 20/25-1
OS_BCVA: 20/100-1

## 2019-04-04 ASSESSMENT — CONFRONTATIONAL VISUAL FIELD TEST (CVF)
OS_FINDINGS: FULL
OD_FINDINGS: FULL

## 2019-04-19 ENCOUNTER — DOCTOR'S OFFICE (OUTPATIENT)
Dept: URBAN - NONMETROPOLITAN AREA CLINIC 1 | Facility: CLINIC | Age: 84
Setting detail: OPHTHALMOLOGY
End: 2019-04-19
Payer: COMMERCIAL

## 2019-04-19 DIAGNOSIS — H35.3211: ICD-10-CM

## 2019-04-19 DIAGNOSIS — H04.123: ICD-10-CM

## 2019-04-19 DIAGNOSIS — H35.3122: ICD-10-CM

## 2019-04-19 DIAGNOSIS — H40.053: ICD-10-CM

## 2019-04-19 DIAGNOSIS — H43.813: ICD-10-CM

## 2019-04-19 PROCEDURE — 83861 MICROFLUID ANALY TEARS: CPT | Performed by: OPHTHALMOLOGY

## 2019-04-19 PROCEDURE — 99024 POSTOP FOLLOW-UP VISIT: CPT | Performed by: OPHTHALMOLOGY

## 2019-04-19 PROCEDURE — 92134 CPTRZ OPH DX IMG PST SGM RTA: CPT | Performed by: OPHTHALMOLOGY

## 2019-04-19 ASSESSMENT — REFRACTION_AUTOREFRACTION
OD_AXIS: 68
OS_SPHERE: +0.50
OS_CYLINDER: -0.75
OD_CYLINDER: -1.50
OD_SPHERE: +1.00
OS_AXIS: 71

## 2019-04-19 ASSESSMENT — REFRACTION_MANIFEST
OS_VA3: 20/
OD_VA2: 20/
OS_VA2: 20/
OD_VA3: 20/
OD_VA1: 20/
OU_VA: 20/
OS_VA3: 20/
OD_VA1: 20/
OD_VA2: 20/
OS_VA2: 20/
OS_VA1: 20/
OU_VA: 20/
OS_VA1: 20/
OD_VA3: 20/

## 2019-04-19 ASSESSMENT — SPHEQUIV_DERIVED
OD_SPHEQUIV: 0.25
OS_SPHEQUIV: 0.125

## 2019-04-19 ASSESSMENT — VISUAL ACUITY
OD_BCVA: 20/40
OS_BCVA: 20/80

## 2019-04-19 ASSESSMENT — REFRACTION_CURRENTRX
OS_OVR_VA: 20/
OD_OVR_VA: 20/
OD_OVR_VA: 20/
OS_OVR_VA: 20/
OS_OVR_VA: 20/
OD_OVR_VA: 20/

## 2019-04-19 ASSESSMENT — LID POSITION - DERMATOCHALASIS
OS_DERMATOCHALASIS: +1
OD_DERMATOCHALASIS: +1

## 2019-04-19 ASSESSMENT — CORNEAL EDEMA CLINICAL DESCRIPTION: OD_CORNEALEDEMA: T

## 2019-04-19 ASSESSMENT — CONFRONTATIONAL VISUAL FIELD TEST (CVF)
OS_FINDINGS: FULL
OD_FINDINGS: FULL

## 2019-04-29 ENCOUNTER — DOCTOR'S OFFICE (OUTPATIENT)
Dept: URBAN - NONMETROPOLITAN AREA CLINIC 1 | Facility: CLINIC | Age: 84
Setting detail: OPHTHALMOLOGY
End: 2019-04-29
Payer: COMMERCIAL

## 2019-04-29 DIAGNOSIS — H35.3211: ICD-10-CM

## 2019-04-29 PROCEDURE — 67028 INJECTION EYE DRUG: CPT | Performed by: OPHTHALMOLOGY

## 2019-04-29 PROCEDURE — 99024 POSTOP FOLLOW-UP VISIT: CPT | Performed by: OPHTHALMOLOGY

## 2019-04-30 ASSESSMENT — VISUAL ACUITY
OD_BCVA: 20/40
OS_BCVA: 20/80

## 2019-04-30 ASSESSMENT — REFRACTION_MANIFEST
OD_VA2: 20/
OS_VA1: 20/
OS_VA2: 20/
OU_VA: 20/
OD_VA3: 20/
OD_VA1: 20/
OS_VA3: 20/
OD_VA3: 20/
OS_VA2: 20/
OS_VA3: 20/
OD_VA1: 20/
OU_VA: 20/
OS_VA1: 20/
OD_VA2: 20/

## 2019-04-30 ASSESSMENT — SPHEQUIV_DERIVED
OS_SPHEQUIV: 0.125
OD_SPHEQUIV: 0.25

## 2019-04-30 ASSESSMENT — REFRACTION_CURRENTRX
OD_OVR_VA: 20/
OS_OVR_VA: 20/
OD_OVR_VA: 20/
OD_OVR_VA: 20/
OS_OVR_VA: 20/
OS_OVR_VA: 20/

## 2019-04-30 ASSESSMENT — REFRACTION_AUTOREFRACTION
OS_SPHERE: +0.50
OS_CYLINDER: -0.75
OD_CYLINDER: -1.50
OD_AXIS: 68
OS_AXIS: 71
OD_SPHERE: +1.00

## 2019-05-17 ENCOUNTER — DOCTOR'S OFFICE (OUTPATIENT)
Dept: URBAN - NONMETROPOLITAN AREA CLINIC 1 | Facility: CLINIC | Age: 84
Setting detail: OPHTHALMOLOGY
End: 2019-05-17
Payer: COMMERCIAL

## 2019-05-17 DIAGNOSIS — H35.3122: ICD-10-CM

## 2019-05-17 DIAGNOSIS — H43.813: ICD-10-CM

## 2019-05-17 DIAGNOSIS — H04.122: ICD-10-CM

## 2019-05-17 DIAGNOSIS — H35.3211: ICD-10-CM

## 2019-05-17 DIAGNOSIS — H04.123: ICD-10-CM

## 2019-05-17 DIAGNOSIS — H40.053: ICD-10-CM

## 2019-05-17 PROCEDURE — 83861 MICROFLUID ANALY TEARS: CPT | Performed by: OPHTHALMOLOGY

## 2019-05-17 PROCEDURE — 92134 CPTRZ OPH DX IMG PST SGM RTA: CPT | Performed by: OPHTHALMOLOGY

## 2019-05-17 PROCEDURE — 99024 POSTOP FOLLOW-UP VISIT: CPT | Performed by: OPHTHALMOLOGY

## 2019-05-17 ASSESSMENT — REFRACTION_MANIFEST
OS_VA1: 20/
OD_VA1: 20/
OD_VA1: 20/
OS_VA2: 20/
OU_VA: 20/
OD_VA3: 20/
OU_VA: 20/
OS_VA1: 20/
OD_VA3: 20/
OD_VA2: 20/
OS_VA2: 20/
OS_VA3: 20/
OD_VA2: 20/
OS_VA3: 20/

## 2019-05-17 ASSESSMENT — CONFRONTATIONAL VISUAL FIELD TEST (CVF)
OS_FINDINGS: FULL
OD_FINDINGS: FULL

## 2019-05-17 ASSESSMENT — VISUAL ACUITY
OD_BCVA: 20/40
OS_BCVA: 20/50-2

## 2019-05-17 ASSESSMENT — REFRACTION_CURRENTRX
OD_OVR_VA: 20/
OD_OVR_VA: 20/
OS_OVR_VA: 20/
OS_OVR_VA: 20/
OD_OVR_VA: 20/
OS_OVR_VA: 20/

## 2019-05-17 ASSESSMENT — REFRACTION_AUTOREFRACTION
OS_AXIS: 71
OD_AXIS: 68
OD_CYLINDER: -1.50
OS_CYLINDER: -0.75
OS_SPHERE: +0.50
OD_SPHERE: +1.00

## 2019-05-17 ASSESSMENT — CORNEAL EDEMA CLINICAL DESCRIPTION: OD_CORNEALEDEMA: T

## 2019-05-17 ASSESSMENT — SPHEQUIV_DERIVED
OS_SPHEQUIV: 0.125
OD_SPHEQUIV: 0.25

## 2019-05-17 ASSESSMENT — LID POSITION - DERMATOCHALASIS
OD_DERMATOCHALASIS: +1
OS_DERMATOCHALASIS: +1

## 2019-05-31 ENCOUNTER — DOCTOR'S OFFICE (OUTPATIENT)
Dept: URBAN - NONMETROPOLITAN AREA CLINIC 1 | Facility: CLINIC | Age: 84
Setting detail: OPHTHALMOLOGY
End: 2019-05-31
Payer: COMMERCIAL

## 2019-05-31 DIAGNOSIS — H04.123: ICD-10-CM

## 2019-05-31 DIAGNOSIS — H35.3211: ICD-10-CM

## 2019-05-31 PROCEDURE — 67028 INJECTION EYE DRUG: CPT | Performed by: OPHTHALMOLOGY

## 2019-05-31 PROCEDURE — 99024 POSTOP FOLLOW-UP VISIT: CPT | Performed by: OPHTHALMOLOGY

## 2019-05-31 ASSESSMENT — REFRACTION_MANIFEST
OD_VA1: 20/
OS_VA2: 20/
OS_VA3: 20/
OD_VA3: 20/
OS_VA3: 20/
OS_VA2: 20/
OS_VA1: 20/
OD_VA1: 20/
OD_VA2: 20/
OU_VA: 20/
OD_VA3: 20/
OU_VA: 20/
OD_VA2: 20/
OS_VA1: 20/

## 2019-05-31 ASSESSMENT — REFRACTION_AUTOREFRACTION
OS_CYLINDER: -0.75
OD_AXIS: 68
OS_SPHERE: +0.50
OD_CYLINDER: -1.50
OS_AXIS: 71
OD_SPHERE: +1.00

## 2019-05-31 ASSESSMENT — VISUAL ACUITY
OS_BCVA: 20/50-2
OD_BCVA: 20/40

## 2019-05-31 ASSESSMENT — SPHEQUIV_DERIVED
OD_SPHEQUIV: 0.25
OS_SPHEQUIV: 0.125

## 2019-05-31 ASSESSMENT — REFRACTION_CURRENTRX
OS_OVR_VA: 20/
OD_OVR_VA: 20/

## 2019-06-17 ENCOUNTER — DOCTOR'S OFFICE (OUTPATIENT)
Dept: URBAN - NONMETROPOLITAN AREA CLINIC 1 | Facility: CLINIC | Age: 84
Setting detail: OPHTHALMOLOGY
End: 2019-06-17
Payer: COMMERCIAL

## 2019-06-17 DIAGNOSIS — H43.811: ICD-10-CM

## 2019-06-17 DIAGNOSIS — H35.3211: ICD-10-CM

## 2019-06-17 DIAGNOSIS — H40.053: ICD-10-CM

## 2019-06-17 DIAGNOSIS — H43.812: ICD-10-CM

## 2019-06-17 DIAGNOSIS — H43.813: ICD-10-CM

## 2019-06-17 DIAGNOSIS — H35.3122: ICD-10-CM

## 2019-06-17 DIAGNOSIS — H04.123: ICD-10-CM

## 2019-06-17 DIAGNOSIS — H04.122: ICD-10-CM

## 2019-06-17 PROCEDURE — 83861 MICROFLUID ANALY TEARS: CPT | Performed by: OPHTHALMOLOGY

## 2019-06-17 PROCEDURE — 92134 CPTRZ OPH DX IMG PST SGM RTA: CPT | Performed by: OPHTHALMOLOGY

## 2019-06-17 PROCEDURE — 99024 POSTOP FOLLOW-UP VISIT: CPT | Performed by: OPHTHALMOLOGY

## 2019-06-17 PROCEDURE — 92226 OPHTHALMOSCOPY EXT SUBSEQUENT: CPT | Performed by: OPHTHALMOLOGY

## 2019-06-17 ASSESSMENT — REFRACTION_MANIFEST
OU_VA: 20/
OD_VA1: 20/
OS_VA1: 20/
OD_VA3: 20/
OD_VA2: 20/
OS_VA1: 20/
OD_VA1: 20/
OS_VA2: 20/
OS_VA2: 20/
OD_VA3: 20/
OS_VA3: 20/
OU_VA: 20/
OD_VA2: 20/
OS_VA3: 20/

## 2019-06-17 ASSESSMENT — LID POSITION - DERMATOCHALASIS
OD_DERMATOCHALASIS: +1
OS_DERMATOCHALASIS: +1

## 2019-06-17 ASSESSMENT — REFRACTION_CURRENTRX
OD_OVR_VA: 20/
OS_OVR_VA: 20/
OD_OVR_VA: 20/
OS_OVR_VA: 20/
OD_OVR_VA: 20/
OS_OVR_VA: 20/

## 2019-06-17 ASSESSMENT — REFRACTION_AUTOREFRACTION
OS_AXIS: 71
OD_SPHERE: +1.00
OS_CYLINDER: -0.75
OS_SPHERE: +0.50
OD_CYLINDER: -1.50
OD_AXIS: 68

## 2019-06-17 ASSESSMENT — SPHEQUIV_DERIVED
OD_SPHEQUIV: 0.25
OS_SPHEQUIV: 0.125

## 2019-06-17 ASSESSMENT — CORNEAL EDEMA CLINICAL DESCRIPTION: OD_CORNEALEDEMA: T

## 2019-06-17 ASSESSMENT — VISUAL ACUITY
OD_BCVA: 20/40
OS_BCVA: 20/150+1

## 2019-06-17 ASSESSMENT — CONFRONTATIONAL VISUAL FIELD TEST (CVF)
OS_FINDINGS: FULL
OD_FINDINGS: FULL

## 2019-06-27 ENCOUNTER — DOCTOR'S OFFICE (OUTPATIENT)
Dept: URBAN - NONMETROPOLITAN AREA CLINIC 1 | Facility: CLINIC | Age: 84
Setting detail: OPHTHALMOLOGY
End: 2019-06-27
Payer: COMMERCIAL

## 2019-06-27 DIAGNOSIS — H35.3211: ICD-10-CM

## 2019-06-27 DIAGNOSIS — H35.051: ICD-10-CM

## 2019-06-27 PROCEDURE — 99024 POSTOP FOLLOW-UP VISIT: CPT | Performed by: OPHTHALMOLOGY

## 2019-06-27 PROCEDURE — 67028 INJECTION EYE DRUG: CPT | Performed by: OPHTHALMOLOGY

## 2019-06-27 ASSESSMENT — REFRACTION_MANIFEST
OD_VA3: 20/
OD_VA2: 20/
OS_VA2: 20/
OS_VA2: 20/
OD_VA1: 20/
OU_VA: 20/
OD_VA3: 20/
OS_VA3: 20/
OU_VA: 20/
OD_VA2: 20/
OS_VA1: 20/
OD_VA1: 20/
OS_VA3: 20/
OS_VA1: 20/

## 2019-06-27 ASSESSMENT — REFRACTION_AUTOREFRACTION
OD_SPHERE: +1.00
OS_AXIS: 71
OS_SPHERE: +0.50
OD_CYLINDER: -1.50
OS_CYLINDER: -0.75
OD_AXIS: 68

## 2019-06-27 ASSESSMENT — VISUAL ACUITY
OD_BCVA: 20/40
OS_BCVA: 20/150+1

## 2019-06-27 ASSESSMENT — SPHEQUIV_DERIVED
OD_SPHEQUIV: 0.25
OS_SPHEQUIV: 0.125

## 2019-06-27 ASSESSMENT — REFRACTION_CURRENTRX
OS_OVR_VA: 20/
OD_OVR_VA: 20/
OS_OVR_VA: 20/
OD_OVR_VA: 20/
OS_OVR_VA: 20/
OD_OVR_VA: 20/

## 2019-07-15 ENCOUNTER — DOCTOR'S OFFICE (OUTPATIENT)
Dept: URBAN - NONMETROPOLITAN AREA CLINIC 1 | Facility: CLINIC | Age: 84
Setting detail: OPHTHALMOLOGY
End: 2019-07-15
Payer: COMMERCIAL

## 2019-07-15 DIAGNOSIS — H04.122: ICD-10-CM

## 2019-07-15 DIAGNOSIS — H40.053: ICD-10-CM

## 2019-07-15 DIAGNOSIS — H04.121: ICD-10-CM

## 2019-07-15 DIAGNOSIS — H35.3211: ICD-10-CM

## 2019-07-15 DIAGNOSIS — H43.813: ICD-10-CM

## 2019-07-15 DIAGNOSIS — H35.3122: ICD-10-CM

## 2019-07-15 PROCEDURE — 92014 COMPRE OPH EXAM EST PT 1/>: CPT | Performed by: OPHTHALMOLOGY

## 2019-07-15 PROCEDURE — 92134 CPTRZ OPH DX IMG PST SGM RTA: CPT | Performed by: OPHTHALMOLOGY

## 2019-07-15 PROCEDURE — 83861 MICROFLUID ANALY TEARS: CPT | Performed by: OPHTHALMOLOGY

## 2019-07-15 ASSESSMENT — REFRACTION_CURRENTRX
OS_OVR_VA: 20/
OD_OVR_VA: 20/

## 2019-07-15 ASSESSMENT — REFRACTION_MANIFEST
OD_VA2: 20/
OS_VA2: 20/
OD_VA2: 20/
OD_VA3: 20/
OU_VA: 20/
OD_VA1: 20/
OS_VA1: 20/
OS_VA3: 20/
OD_VA1: 20/
OU_VA: 20/
OS_VA2: 20/
OS_VA3: 20/
OD_VA3: 20/
OS_VA1: 20/

## 2019-07-15 ASSESSMENT — REFRACTION_AUTOREFRACTION
OD_SPHERE: +1.00
OS_AXIS: 71
OD_CYLINDER: -1.50
OS_SPHERE: +0.50
OS_CYLINDER: -0.75
OD_AXIS: 68

## 2019-07-15 ASSESSMENT — LID POSITION - DERMATOCHALASIS
OS_DERMATOCHALASIS: +1
OD_DERMATOCHALASIS: +1

## 2019-07-15 ASSESSMENT — VISUAL ACUITY
OD_BCVA: 20/30-2
OS_BCVA: 20/60-2

## 2019-07-15 ASSESSMENT — SPHEQUIV_DERIVED
OD_SPHEQUIV: 0.25
OS_SPHEQUIV: 0.125

## 2019-07-15 ASSESSMENT — CORNEAL EDEMA CLINICAL DESCRIPTION: OD_CORNEALEDEMA: T

## 2019-07-15 ASSESSMENT — CONFRONTATIONAL VISUAL FIELD TEST (CVF)
OS_FINDINGS: FULL
OD_FINDINGS: FULL

## 2019-07-29 ENCOUNTER — DOCTOR'S OFFICE (OUTPATIENT)
Dept: URBAN - NONMETROPOLITAN AREA CLINIC 1 | Facility: CLINIC | Age: 84
Setting detail: OPHTHALMOLOGY
End: 2019-07-29
Payer: COMMERCIAL

## 2019-07-29 DIAGNOSIS — H40.053: ICD-10-CM

## 2019-07-29 DIAGNOSIS — H43.812: ICD-10-CM

## 2019-07-29 DIAGNOSIS — H43.811: ICD-10-CM

## 2019-07-29 DIAGNOSIS — H43.813: ICD-10-CM

## 2019-07-29 DIAGNOSIS — H35.3211: ICD-10-CM

## 2019-07-29 DIAGNOSIS — H35.3122: ICD-10-CM

## 2019-07-29 PROCEDURE — 92240 ICG ANGIOGRAPHY I&R UNI/BI: CPT | Performed by: OPHTHALMOLOGY

## 2019-07-29 PROCEDURE — 92014 COMPRE OPH EXAM EST PT 1/>: CPT | Performed by: OPHTHALMOLOGY

## 2019-07-29 PROCEDURE — 92226 OPHTHALMOSCOPY EXT SUBSEQUENT: CPT | Performed by: OPHTHALMOLOGY

## 2019-07-29 ASSESSMENT — REFRACTION_MANIFEST
OU_VA: 20/
OS_VA2: 20/
OS_VA3: 20/
OD_VA1: 20/
OD_VA2: 20/
OS_VA1: 20/
OD_VA3: 20/
OS_VA2: 20/
OS_VA3: 20/
OD_VA1: 20/
OS_VA1: 20/
OD_VA2: 20/
OD_VA3: 20/
OU_VA: 20/

## 2019-07-29 ASSESSMENT — REFRACTION_CURRENTRX
OS_OVR_VA: 20/
OD_OVR_VA: 20/
OD_OVR_VA: 20/
OS_OVR_VA: 20/
OS_OVR_VA: 20/
OD_OVR_VA: 20/

## 2019-07-29 ASSESSMENT — VISUAL ACUITY
OD_BCVA: 20/30-2
OS_BCVA: 20/60-2

## 2019-07-29 ASSESSMENT — REFRACTION_AUTOREFRACTION
OD_AXIS: 68
OS_SPHERE: +0.50
OS_CYLINDER: -0.75
OD_SPHERE: +1.00
OD_CYLINDER: -1.50
OS_AXIS: 71

## 2019-07-29 ASSESSMENT — SPHEQUIV_DERIVED
OD_SPHEQUIV: 0.25
OS_SPHEQUIV: 0.125

## 2019-07-29 ASSESSMENT — CONFRONTATIONAL VISUAL FIELD TEST (CVF)
OD_FINDINGS: FULL
OS_FINDINGS: FULL

## 2019-07-29 ASSESSMENT — LID POSITION - DERMATOCHALASIS
OD_DERMATOCHALASIS: +1
OS_DERMATOCHALASIS: +1

## 2019-07-29 ASSESSMENT — CORNEAL EDEMA CLINICAL DESCRIPTION: OD_CORNEALEDEMA: T

## 2019-08-02 ENCOUNTER — DOCTOR'S OFFICE (OUTPATIENT)
Dept: URBAN - NONMETROPOLITAN AREA CLINIC 1 | Facility: CLINIC | Age: 84
Setting detail: OPHTHALMOLOGY
End: 2019-08-02
Payer: COMMERCIAL

## 2019-08-02 DIAGNOSIS — H35.3211: ICD-10-CM

## 2019-08-02 DIAGNOSIS — H35.3122: ICD-10-CM

## 2019-08-02 PROCEDURE — 67028 INJECTION EYE DRUG: CPT | Performed by: OPHTHALMOLOGY

## 2019-08-02 PROCEDURE — 92235 FLUORESCEIN ANGRPH MLTIFRAME: CPT | Performed by: OPHTHALMOLOGY

## 2019-08-02 PROCEDURE — 92014 COMPRE OPH EXAM EST PT 1/>: CPT | Performed by: OPHTHALMOLOGY

## 2019-08-02 PROCEDURE — 92250 FUNDUS PHOTOGRAPHY W/I&R: CPT | Performed by: OPHTHALMOLOGY

## 2019-08-02 ASSESSMENT — VISUAL ACUITY
OS_BCVA: 20/80
OD_BCVA: 20/30-2

## 2019-08-02 ASSESSMENT — SPHEQUIV_DERIVED
OD_SPHEQUIV: 0.25
OS_SPHEQUIV: 2.125

## 2019-08-02 ASSESSMENT — REFRACTION_MANIFEST
OU_VA: 20/
OD_VA1: 20/
OS_VA3: 20/
OS_VA2: 20/
OD_VA2: 20/
OD_VA3: 20/
OS_VA1: 20/
OS_VA2: 20/
OS_VA3: 20/
OD_VA1: 20/
OS_VA1: 20/
OD_VA3: 20/
OD_VA2: 20/
OU_VA: 20/

## 2019-08-02 ASSESSMENT — REFRACTION_AUTOREFRACTION
OS_AXIS: 084
OD_CYLINDER: -1.50
OS_SPHERE: +3.25
OD_AXIS: 089
OD_SPHERE: +1.00
OS_CYLINDER: -2.25

## 2019-08-02 ASSESSMENT — CONFRONTATIONAL VISUAL FIELD TEST (CVF)
OD_FINDINGS: FULL
OS_FINDINGS: FULL

## 2019-08-02 ASSESSMENT — REFRACTION_CURRENTRX
OS_OVR_VA: 20/
OD_OVR_VA: 20/
OS_OVR_VA: 20/
OD_OVR_VA: 20/
OS_OVR_VA: 20/
OD_OVR_VA: 20/

## 2019-08-02 ASSESSMENT — LID POSITION - DERMATOCHALASIS
OS_DERMATOCHALASIS: +1
OD_DERMATOCHALASIS: +1

## 2019-08-02 ASSESSMENT — CORNEAL EDEMA CLINICAL DESCRIPTION: OD_CORNEALEDEMA: T

## 2019-08-16 ENCOUNTER — DOCTOR'S OFFICE (OUTPATIENT)
Dept: URBAN - NONMETROPOLITAN AREA CLINIC 1 | Facility: CLINIC | Age: 84
Setting detail: OPHTHALMOLOGY
End: 2019-08-16
Payer: COMMERCIAL

## 2019-08-16 DIAGNOSIS — H43.813: ICD-10-CM

## 2019-08-16 DIAGNOSIS — H40.053: ICD-10-CM

## 2019-08-16 DIAGNOSIS — H35.3211: ICD-10-CM

## 2019-08-16 DIAGNOSIS — H35.3122: ICD-10-CM

## 2019-08-16 PROCEDURE — 92014 COMPRE OPH EXAM EST PT 1/>: CPT | Performed by: OPHTHALMOLOGY

## 2019-08-16 PROCEDURE — 92134 CPTRZ OPH DX IMG PST SGM RTA: CPT | Performed by: OPHTHALMOLOGY

## 2019-08-16 ASSESSMENT — REFRACTION_MANIFEST
OD_VA3: 20/
OD_VA3: 20/
OU_VA: 20/
OD_VA2: 20/
OS_VA2: 20/
OD_VA1: 20/
OS_VA1: 20/
OS_VA1: 20/
OU_VA: 20/
OS_VA3: 20/
OD_VA1: 20/
OS_VA3: 20/
OS_VA2: 20/
OD_VA2: 20/

## 2019-08-16 ASSESSMENT — REFRACTION_CURRENTRX
OS_OVR_VA: 20/
OD_OVR_VA: 20/
OD_OVR_VA: 20/
OS_OVR_VA: 20/
OS_OVR_VA: 20/
OD_OVR_VA: 20/

## 2019-08-16 ASSESSMENT — REFRACTION_AUTOREFRACTION
OD_AXIS: 089
OS_SPHERE: +3.25
OS_CYLINDER: -2.25
OS_AXIS: 084
OD_SPHERE: +1.00
OD_CYLINDER: -1.50

## 2019-08-16 ASSESSMENT — LID POSITION - DERMATOCHALASIS
OD_DERMATOCHALASIS: +1
OS_DERMATOCHALASIS: +1

## 2019-08-16 ASSESSMENT — VISUAL ACUITY
OS_BCVA: 20/80
OD_BCVA: 20/30-2

## 2019-08-16 ASSESSMENT — CONFRONTATIONAL VISUAL FIELD TEST (CVF)
OS_FINDINGS: FULL
OD_FINDINGS: FULL

## 2019-08-16 ASSESSMENT — CORNEAL EDEMA CLINICAL DESCRIPTION: OD_CORNEALEDEMA: T

## 2019-08-16 ASSESSMENT — SPHEQUIV_DERIVED
OD_SPHEQUIV: 0.25
OS_SPHEQUIV: 2.125

## 2019-09-03 ENCOUNTER — DOCTOR'S OFFICE (OUTPATIENT)
Dept: URBAN - NONMETROPOLITAN AREA CLINIC 1 | Facility: CLINIC | Age: 84
Setting detail: OPHTHALMOLOGY
End: 2019-09-03
Payer: COMMERCIAL

## 2019-09-03 DIAGNOSIS — H35.3211: ICD-10-CM

## 2019-09-03 PROCEDURE — 67028 INJECTION EYE DRUG: CPT | Performed by: OPHTHALMOLOGY

## 2019-09-04 ASSESSMENT — REFRACTION_CURRENTRX
OS_OVR_VA: 20/
OS_OVR_VA: 20/
OD_OVR_VA: 20/
OS_OVR_VA: 20/
OD_OVR_VA: 20/
OD_OVR_VA: 20/

## 2019-09-04 ASSESSMENT — REFRACTION_MANIFEST
OD_VA2: 20/
OD_VA1: 20/
OS_VA1: 20/
OS_VA3: 20/
OD_VA2: 20/
OD_VA1: 20/
OD_VA3: 20/
OS_VA2: 20/
OU_VA: 20/
OS_VA3: 20/
OD_VA3: 20/
OS_VA2: 20/
OU_VA: 20/
OS_VA1: 20/

## 2019-09-04 ASSESSMENT — SPHEQUIV_DERIVED
OS_SPHEQUIV: 2.125
OD_SPHEQUIV: 0.25

## 2019-09-04 ASSESSMENT — REFRACTION_AUTOREFRACTION
OS_AXIS: 084
OD_CYLINDER: -1.50
OS_SPHERE: +3.25
OD_SPHERE: +1.00
OS_CYLINDER: -2.25
OD_AXIS: 089

## 2019-09-04 ASSESSMENT — VISUAL ACUITY
OD_BCVA: 20/30-2
OS_BCVA: 20/80

## 2019-09-30 ENCOUNTER — DOCTOR'S OFFICE (OUTPATIENT)
Dept: URBAN - NONMETROPOLITAN AREA CLINIC 1 | Facility: CLINIC | Age: 84
Setting detail: OPHTHALMOLOGY
End: 2019-09-30
Payer: COMMERCIAL

## 2019-09-30 DIAGNOSIS — H43.812: ICD-10-CM

## 2019-09-30 DIAGNOSIS — H04.123: ICD-10-CM

## 2019-09-30 DIAGNOSIS — H40.053: ICD-10-CM

## 2019-09-30 DIAGNOSIS — H43.811: ICD-10-CM

## 2019-09-30 DIAGNOSIS — H35.3211: ICD-10-CM

## 2019-09-30 DIAGNOSIS — H35.3122: ICD-10-CM

## 2019-09-30 DIAGNOSIS — H04.121: ICD-10-CM

## 2019-09-30 DIAGNOSIS — H43.813: ICD-10-CM

## 2019-09-30 DIAGNOSIS — H04.122: ICD-10-CM

## 2019-09-30 PROCEDURE — 92014 COMPRE OPH EXAM EST PT 1/>: CPT | Performed by: OPHTHALMOLOGY

## 2019-09-30 PROCEDURE — 92226 OPHTHALMOSCOPY EXT SUBSEQUENT: CPT | Performed by: OPHTHALMOLOGY

## 2019-09-30 PROCEDURE — 83861 MICROFLUID ANALY TEARS: CPT | Performed by: OPHTHALMOLOGY

## 2019-09-30 PROCEDURE — 92134 CPTRZ OPH DX IMG PST SGM RTA: CPT | Performed by: OPHTHALMOLOGY

## 2019-09-30 ASSESSMENT — REFRACTION_CURRENTRX
OD_OVR_VA: 20/
OS_OVR_VA: 20/
OD_OVR_VA: 20/
OS_OVR_VA: 20/
OS_OVR_VA: 20/
OD_OVR_VA: 20/

## 2019-09-30 ASSESSMENT — REFRACTION_MANIFEST
OD_VA2: 20/
OS_VA2: 20/
OS_VA1: 20/
OD_VA3: 20/
OD_VA3: 20/
OS_VA3: 20/
OU_VA: 20/
OD_VA1: 20/
OD_VA1: 20/
OS_VA2: 20/
OU_VA: 20/
OS_VA3: 20/
OD_VA2: 20/
OS_VA1: 20/

## 2019-09-30 ASSESSMENT — SPHEQUIV_DERIVED
OD_SPHEQUIV: 0.25
OS_SPHEQUIV: 2.125

## 2019-09-30 ASSESSMENT — REFRACTION_AUTOREFRACTION
OD_CYLINDER: -1.50
OS_SPHERE: +3.25
OS_CYLINDER: -2.25
OD_AXIS: 089
OD_SPHERE: +1.00
OS_AXIS: 084

## 2019-09-30 ASSESSMENT — LID POSITION - DERMATOCHALASIS
OS_DERMATOCHALASIS: +1
OD_DERMATOCHALASIS: +1

## 2019-09-30 ASSESSMENT — CORNEAL EDEMA CLINICAL DESCRIPTION: OD_CORNEALEDEMA: T

## 2019-09-30 ASSESSMENT — VISUAL ACUITY
OD_BCVA: 20/30-2
OS_BCVA: 20/80

## 2019-09-30 ASSESSMENT — CONFRONTATIONAL VISUAL FIELD TEST (CVF)
OD_FINDINGS: FULL
OS_FINDINGS: FULL

## 2019-10-02 ENCOUNTER — DOCTOR'S OFFICE (OUTPATIENT)
Dept: URBAN - NONMETROPOLITAN AREA CLINIC 1 | Facility: CLINIC | Age: 84
Setting detail: OPHTHALMOLOGY
End: 2019-10-02
Payer: COMMERCIAL

## 2019-10-02 DIAGNOSIS — H35.3211: ICD-10-CM

## 2019-10-02 PROCEDURE — 67028 INJECTION EYE DRUG: CPT | Performed by: OPHTHALMOLOGY

## 2019-10-03 ENCOUNTER — RX ONLY (RX ONLY)
Age: 84
End: 2019-10-03

## 2019-10-03 ASSESSMENT — REFRACTION_MANIFEST
OS_VA3: 20/
OD_VA2: 20/
OS_VA2: 20/
OS_VA1: 20/
OD_VA1: 20/
OD_VA1: 20/
OS_VA1: 20/
OS_VA3: 20/
OD_VA3: 20/
OD_VA3: 20/
OD_VA2: 20/
OU_VA: 20/
OU_VA: 20/
OS_VA2: 20/

## 2019-10-03 ASSESSMENT — REFRACTION_AUTOREFRACTION
OD_AXIS: 089
OS_AXIS: 084
OS_CYLINDER: -2.25
OD_SPHERE: +1.00
OS_SPHERE: +3.25
OD_CYLINDER: -1.50

## 2019-10-03 ASSESSMENT — VISUAL ACUITY
OD_BCVA: 20/30-2
OS_BCVA: 20/80

## 2019-10-03 ASSESSMENT — SPHEQUIV_DERIVED
OS_SPHEQUIV: 2.125
OD_SPHEQUIV: 0.25

## 2019-10-03 ASSESSMENT — REFRACTION_CURRENTRX
OD_OVR_VA: 20/
OS_OVR_VA: 20/
OS_OVR_VA: 20/
OD_OVR_VA: 20/
OS_OVR_VA: 20/
OD_OVR_VA: 20/

## 2019-10-21 ENCOUNTER — DOCTOR'S OFFICE (OUTPATIENT)
Dept: URBAN - NONMETROPOLITAN AREA CLINIC 1 | Facility: CLINIC | Age: 84
Setting detail: OPHTHALMOLOGY
End: 2019-10-21
Payer: COMMERCIAL

## 2019-10-21 DIAGNOSIS — H35.3211: ICD-10-CM

## 2019-10-21 DIAGNOSIS — H35.3122: ICD-10-CM

## 2019-10-21 PROCEDURE — 92235 FLUORESCEIN ANGRPH MLTIFRAME: CPT | Performed by: OPHTHALMOLOGY

## 2019-10-21 PROCEDURE — 92250 FUNDUS PHOTOGRAPHY W/I&R: CPT | Performed by: OPHTHALMOLOGY

## 2019-10-21 PROCEDURE — 92014 COMPRE OPH EXAM EST PT 1/>: CPT | Performed by: OPHTHALMOLOGY

## 2019-10-21 ASSESSMENT — SPHEQUIV_DERIVED
OD_SPHEQUIV: 0.25
OS_SPHEQUIV: 2.125

## 2019-10-21 ASSESSMENT — LID POSITION - DERMATOCHALASIS
OD_DERMATOCHALASIS: +1
OS_DERMATOCHALASIS: +1

## 2019-10-21 ASSESSMENT — REFRACTION_AUTOREFRACTION
OS_AXIS: 084
OD_AXIS: 089
OS_CYLINDER: -2.25
OD_SPHERE: +1.00
OD_CYLINDER: -1.50
OS_SPHERE: +3.25

## 2019-10-21 ASSESSMENT — REFRACTION_CURRENTRX
OD_OVR_VA: 20/
OS_OVR_VA: 20/
OD_OVR_VA: 20/
OD_OVR_VA: 20/

## 2019-10-21 ASSESSMENT — VISUAL ACUITY
OD_BCVA: 20/30-2
OS_BCVA: 20/80

## 2019-10-21 ASSESSMENT — REFRACTION_MANIFEST
OD_VA2: 20/
OS_VA1: 20/
OD_VA1: 20/
OD_VA1: 20/
OU_VA: 20/
OS_VA1: 20/
OD_VA2: 20/
OD_VA3: 20/
OS_VA3: 20/
OS_VA2: 20/
OU_VA: 20/
OD_VA3: 20/
OS_VA2: 20/
OS_VA3: 20/

## 2019-10-21 ASSESSMENT — CORNEAL EDEMA CLINICAL DESCRIPTION: OD_CORNEALEDEMA: T

## 2019-10-21 ASSESSMENT — CONFRONTATIONAL VISUAL FIELD TEST (CVF)
OD_FINDINGS: FULL
OS_FINDINGS: FULL

## 2019-11-05 ENCOUNTER — DOCTOR'S OFFICE (OUTPATIENT)
Dept: URBAN - NONMETROPOLITAN AREA CLINIC 1 | Facility: CLINIC | Age: 84
Setting detail: OPHTHALMOLOGY
End: 2019-11-05
Payer: COMMERCIAL

## 2019-11-05 DIAGNOSIS — H35.3211: ICD-10-CM

## 2019-11-05 PROCEDURE — 67028 INJECTION EYE DRUG: CPT | Performed by: OPHTHALMOLOGY

## 2019-11-06 ASSESSMENT — REFRACTION_AUTOREFRACTION
OD_SPHERE: +1.00
OS_SPHERE: +3.25
OS_CYLINDER: -2.25
OS_AXIS: 084
OD_CYLINDER: -1.50
OD_AXIS: 089

## 2019-11-06 ASSESSMENT — REFRACTION_MANIFEST
OS_VA1: 20/
OS_VA3: 20/
OS_VA2: 20/
OU_VA: 20/
OD_VA2: 20/
OD_VA3: 20/
OD_VA3: 20/
OS_VA3: 20/
OS_VA1: 20/
OD_VA2: 20/
OD_VA1: 20/
OD_VA1: 20/
OU_VA: 20/
OS_VA2: 20/

## 2019-11-06 ASSESSMENT — VISUAL ACUITY
OS_BCVA: 20/80
OD_BCVA: 20/30-2

## 2019-11-06 ASSESSMENT — REFRACTION_CURRENTRX
OS_OVR_VA: 20/
OD_OVR_VA: 20/
OS_OVR_VA: 20/
OS_OVR_VA: 20/
OD_OVR_VA: 20/
OD_OVR_VA: 20/

## 2019-11-06 ASSESSMENT — SPHEQUIV_DERIVED
OD_SPHEQUIV: 0.25
OS_SPHEQUIV: 2.125

## 2019-11-18 ENCOUNTER — DOCTOR'S OFFICE (OUTPATIENT)
Dept: URBAN - NONMETROPOLITAN AREA CLINIC 1 | Facility: CLINIC | Age: 84
Setting detail: OPHTHALMOLOGY
End: 2019-11-18
Payer: COMMERCIAL

## 2019-11-18 DIAGNOSIS — H04.121: ICD-10-CM

## 2019-11-18 DIAGNOSIS — H43.812: ICD-10-CM

## 2019-11-18 DIAGNOSIS — H40.053: ICD-10-CM

## 2019-11-18 DIAGNOSIS — H04.122: ICD-10-CM

## 2019-11-18 DIAGNOSIS — H43.813: ICD-10-CM

## 2019-11-18 DIAGNOSIS — H35.3122: ICD-10-CM

## 2019-11-18 DIAGNOSIS — H43.811: ICD-10-CM

## 2019-11-18 DIAGNOSIS — H35.3211: ICD-10-CM

## 2019-11-18 PROCEDURE — 83861 MICROFLUID ANALY TEARS: CPT | Performed by: OPHTHALMOLOGY

## 2019-11-18 PROCEDURE — 92134 CPTRZ OPH DX IMG PST SGM RTA: CPT | Performed by: OPHTHALMOLOGY

## 2019-11-18 PROCEDURE — 92226 OPHTHALMOSCOPY EXT SUBSEQUENT: CPT | Performed by: OPHTHALMOLOGY

## 2019-11-18 PROCEDURE — 92014 COMPRE OPH EXAM EST PT 1/>: CPT | Performed by: OPHTHALMOLOGY

## 2019-11-18 ASSESSMENT — REFRACTION_CURRENTRX
OD_OVR_VA: 20/
OD_OVR_VA: 20/
OS_OVR_VA: 20/
OD_OVR_VA: 20/

## 2019-11-18 ASSESSMENT — REFRACTION_MANIFEST
OD_VA1: 20/
OD_VA2: 20/
OD_VA2: 20/
OS_VA3: 20/
OU_VA: 20/
OS_VA1: 20/
OS_VA1: 20/
OD_VA3: 20/
OS_VA2: 20/
OD_VA1: 20/
OS_VA3: 20/
OS_VA2: 20/
OD_VA3: 20/
OU_VA: 20/

## 2019-11-18 ASSESSMENT — SPHEQUIV_DERIVED
OD_SPHEQUIV: 0.25
OS_SPHEQUIV: 2.125

## 2019-11-18 ASSESSMENT — REFRACTION_AUTOREFRACTION
OS_CYLINDER: -2.25
OS_AXIS: 084
OD_SPHERE: +1.00
OS_SPHERE: +3.25
OD_CYLINDER: -1.50
OD_AXIS: 089

## 2019-11-18 ASSESSMENT — CORNEAL EDEMA CLINICAL DESCRIPTION: OD_CORNEALEDEMA: T

## 2019-11-18 ASSESSMENT — CONFRONTATIONAL VISUAL FIELD TEST (CVF)
OS_FINDINGS: FULL
OD_FINDINGS: FULL

## 2019-11-18 ASSESSMENT — VISUAL ACUITY
OD_BCVA: 20/40+2
OS_BCVA: 20/100

## 2019-11-18 ASSESSMENT — LID POSITION - DERMATOCHALASIS
OD_DERMATOCHALASIS: +1
OS_DERMATOCHALASIS: +1

## 2019-12-05 ENCOUNTER — DOCTOR'S OFFICE (OUTPATIENT)
Dept: URBAN - NONMETROPOLITAN AREA CLINIC 1 | Facility: CLINIC | Age: 84
Setting detail: OPHTHALMOLOGY
End: 2019-12-05
Payer: COMMERCIAL

## 2019-12-05 ENCOUNTER — RX ONLY (RX ONLY)
Age: 84
End: 2019-12-05

## 2019-12-05 DIAGNOSIS — H35.3211: ICD-10-CM

## 2019-12-05 PROCEDURE — 92240 ICG ANGIOGRAPHY I&R UNI/BI: CPT | Performed by: OPHTHALMOLOGY

## 2019-12-05 PROCEDURE — 67028 INJECTION EYE DRUG: CPT | Performed by: OPHTHALMOLOGY

## 2019-12-05 ASSESSMENT — SPHEQUIV_DERIVED
OS_SPHEQUIV: 2.125
OD_SPHEQUIV: 0.25

## 2019-12-05 ASSESSMENT — REFRACTION_AUTOREFRACTION
OS_CYLINDER: -2.25
OD_SPHERE: +1.00
OD_AXIS: 089
OS_SPHERE: +3.25
OD_CYLINDER: -1.50
OS_AXIS: 084

## 2019-12-05 ASSESSMENT — REFRACTION_MANIFEST
OS_VA2: 20/
OS_VA2: 20/
OS_VA1: 20/
OD_VA3: 20/
OS_VA3: 20/
OU_VA: 20/
OD_VA2: 20/
OU_VA: 20/
OD_VA2: 20/
OS_VA3: 20/
OD_VA3: 20/
OD_VA1: 20/
OS_VA1: 20/
OD_VA1: 20/

## 2019-12-05 ASSESSMENT — VISUAL ACUITY
OD_BCVA: 20/40+2
OS_BCVA: 20/100

## 2019-12-05 ASSESSMENT — REFRACTION_CURRENTRX
OS_OVR_VA: 20/
OD_OVR_VA: 20/
OS_OVR_VA: 20/
OD_OVR_VA: 20/
OS_OVR_VA: 20/
OD_OVR_VA: 20/

## 2019-12-19 ENCOUNTER — DOCTOR'S OFFICE (OUTPATIENT)
Dept: URBAN - NONMETROPOLITAN AREA CLINIC 1 | Facility: CLINIC | Age: 84
Setting detail: OPHTHALMOLOGY
End: 2019-12-19
Payer: COMMERCIAL

## 2019-12-19 ENCOUNTER — RX ONLY (RX ONLY)
Age: 84
End: 2019-12-19

## 2019-12-19 DIAGNOSIS — H35.3211: ICD-10-CM

## 2019-12-19 DIAGNOSIS — H04.123: ICD-10-CM

## 2019-12-19 DIAGNOSIS — H35.3221: ICD-10-CM

## 2019-12-19 DIAGNOSIS — H43.813: ICD-10-CM

## 2019-12-19 DIAGNOSIS — H04.122: ICD-10-CM

## 2019-12-19 DIAGNOSIS — H40.053: ICD-10-CM

## 2019-12-19 PROBLEM — H35.051 CHOROIDAL NEOVASCULARIZATION; RIGHT EYE: Status: ACTIVE | Noted: 2019-06-17

## 2019-12-19 PROBLEM — Z96.1 PSEUDOPHAKIA ; BOTH EYES: Status: ACTIVE | Noted: 2017-10-16

## 2019-12-19 PROBLEM — H53.2 DIPLOPIA: Status: ACTIVE | Noted: 2019-07-15

## 2019-12-19 PROBLEM — H04.121 DRY EYE; RIGHT EYE, LEFT EYE, BOTH EYES: Status: ACTIVE | Noted: 2019-05-31

## 2019-12-19 PROCEDURE — 92134 CPTRZ OPH DX IMG PST SGM RTA: CPT | Performed by: OPHTHALMOLOGY

## 2019-12-19 PROCEDURE — 92014 COMPRE OPH EXAM EST PT 1/>: CPT | Performed by: OPHTHALMOLOGY

## 2019-12-19 PROCEDURE — 83861 MICROFLUID ANALY TEARS: CPT | Performed by: OPHTHALMOLOGY

## 2019-12-19 ASSESSMENT — VISUAL ACUITY
OS_BCVA: 20/100-1
OD_BCVA: 20/40

## 2019-12-19 ASSESSMENT — REFRACTION_MANIFEST
OD_VA3: 20/
OD_VA1: 20/
OS_VA3: 20/
OS_VA2: 20/
OD_VA1: 20/
OU_VA: 20/
OD_VA2: 20/
OU_VA: 20/
OS_VA3: 20/
OD_VA3: 20/
OD_VA2: 20/
OS_VA2: 20/
OS_VA1: 20/
OS_VA1: 20/

## 2019-12-19 ASSESSMENT — REFRACTION_AUTOREFRACTION
OD_AXIS: 089
OS_SPHERE: +3.25
OS_AXIS: 084
OD_CYLINDER: -1.50
OD_SPHERE: +1.00
OS_CYLINDER: -2.25

## 2019-12-19 ASSESSMENT — LID POSITION - DERMATOCHALASIS
OD_DERMATOCHALASIS: +1
OS_DERMATOCHALASIS: +1

## 2019-12-19 ASSESSMENT — CONFRONTATIONAL VISUAL FIELD TEST (CVF)
OS_FINDINGS: FULL
OD_FINDINGS: SUPERONASAL DEFECT

## 2019-12-19 ASSESSMENT — REFRACTION_CURRENTRX
OD_OVR_VA: 20/
OS_OVR_VA: 20/
OS_OVR_VA: 20/
OD_OVR_VA: 20/
OD_OVR_VA: 20/
OS_OVR_VA: 20/

## 2019-12-19 ASSESSMENT — SPHEQUIV_DERIVED
OD_SPHEQUIV: 0.25
OS_SPHEQUIV: 2.125

## 2019-12-19 ASSESSMENT — CORNEAL EDEMA CLINICAL DESCRIPTION: OD_CORNEALEDEMA: T

## 2020-09-29 ENCOUNTER — HOSPITAL ENCOUNTER (EMERGENCY)
Facility: HOSPITAL | Age: 85
Discharge: HOME | End: 2020-09-29
Attending: EMERGENCY MEDICINE | Admitting: EMERGENCY MEDICINE
Payer: COMMERCIAL

## 2020-09-29 ENCOUNTER — APPOINTMENT (EMERGENCY)
Dept: RADIOLOGY | Facility: HOSPITAL | Age: 85
End: 2020-09-29
Payer: COMMERCIAL

## 2020-09-29 ENCOUNTER — APPOINTMENT (EMERGENCY)
Dept: CT IMAGING | Facility: HOSPITAL | Age: 85
End: 2020-09-29
Payer: COMMERCIAL

## 2020-09-29 VITALS
SYSTOLIC BLOOD PRESSURE: 103 MMHG | DIASTOLIC BLOOD PRESSURE: 60 MMHG | WEIGHT: 152 LBS | HEIGHT: 69 IN | OXYGEN SATURATION: 96 % | BODY MASS INDEX: 22.51 KG/M2 | HEART RATE: 66 BPM | TEMPERATURE: 96.9 F | RESPIRATION RATE: 34 BRPM
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
## 2020-09-29 DIAGNOSIS — S70.00XA CONTUSION, HIP: ICD-10-CM

## 2020-09-29 LAB
ALBUMIN SERPL BCP-MCNC: 3.2 G/DL (ref 3.5–5)
ALP SERPL-CCNC: 66 U/L (ref 46–116)
ALT SERPL W P-5'-P-CCNC: 43 U/L (ref 12–78)
ANION GAP SERPL CALCULATED.3IONS-SCNC: 5 MMOL/L (ref 4–13)
APTT PPP: 36 SECONDS (ref 23–37)
AST SERPL W P-5'-P-CCNC: 32 U/L (ref 5–45)
BASOPHILS # BLD AUTO: 0.03 THOUSANDS/ΜL (ref 0–0.1)
BASOPHILS NFR BLD AUTO: 0 % (ref 0–1)
BILIRUB SERPL-MCNC: 0.6 MG/DL (ref 0.2–1)
BUN SERPL-MCNC: 18 MG/DL (ref 5–25)
CALCIUM ALBUM COR SERPL-MCNC: 9.8 MG/DL (ref 8.3–10.1)
CALCIUM SERPL-MCNC: 9.2 MG/DL (ref 8.3–10.1)
CHLORIDE SERPL-SCNC: 104 MMOL/L (ref 100–108)
CO2 SERPL-SCNC: 31 MMOL/L (ref 21–32)
CREAT SERPL-MCNC: 1.2 MG/DL (ref 0.6–1.3)
EOSINOPHIL # BLD AUTO: 0.27 THOUSAND/ΜL (ref 0–0.61)
EOSINOPHIL NFR BLD AUTO: 4 % (ref 0–6)
ERYTHROCYTE [DISTWIDTH] IN BLOOD BY AUTOMATED COUNT: 13.2 % (ref 11.6–15.1)
GFR SERPL CREATININE-BSD FRML MDRD: 54 ML/MIN/1.73SQ M
GLUCOSE SERPL-MCNC: 95 MG/DL (ref 65–140)
HCT VFR BLD AUTO: 50.9 % (ref 36.5–49.3)
HGB BLD-MCNC: 16.5 G/DL (ref 12–17)
IMM GRANULOCYTES # BLD AUTO: 0.05 THOUSAND/UL (ref 0–0.2)
IMM GRANULOCYTES NFR BLD AUTO: 1 % (ref 0–2)
INR PPP: 2.17 (ref 0.84–1.19)
LYMPHOCYTES # BLD AUTO: 1.2 THOUSANDS/ΜL (ref 0.6–4.47)
LYMPHOCYTES NFR BLD AUTO: 16 % (ref 14–44)
MCH RBC QN AUTO: 29.5 PG (ref 26.8–34.3)
MCHC RBC AUTO-ENTMCNC: 32.4 G/DL (ref 31.4–37.4)
MCV RBC AUTO: 91 FL (ref 82–98)
MONOCYTES # BLD AUTO: 0.66 THOUSAND/ΜL (ref 0.17–1.22)
MONOCYTES NFR BLD AUTO: 9 % (ref 4–12)
NEUTROPHILS # BLD AUTO: 5.34 THOUSANDS/ΜL (ref 1.85–7.62)
NEUTS SEG NFR BLD AUTO: 70 % (ref 43–75)
NRBC BLD AUTO-RTO: 0 /100 WBCS
PLATELET # BLD AUTO: 152 THOUSANDS/UL (ref 149–390)
PMV BLD AUTO: 8.9 FL (ref 8.9–12.7)
POTASSIUM SERPL-SCNC: 4.2 MMOL/L (ref 3.5–5.3)
PROT SERPL-MCNC: 6.7 G/DL (ref 6.4–8.2)
PROTHROMBIN TIME: 23.7 SECONDS (ref 11.6–14.5)
RBC # BLD AUTO: 5.59 MILLION/UL (ref 3.88–5.62)
SODIUM SERPL-SCNC: 140 MMOL/L (ref 136–145)
WBC # BLD AUTO: 7.55 THOUSAND/UL (ref 4.31–10.16)

## 2020-09-29 PROCEDURE — 85025 COMPLETE CBC W/AUTO DIFF WBC: CPT | Performed by: EMERGENCY MEDICINE

## 2020-09-29 PROCEDURE — G1004 CDSM NDSC: HCPCS

## 2020-09-29 PROCEDURE — 99284 EMERGENCY DEPT VISIT MOD MDM: CPT

## 2020-09-29 PROCEDURE — 80053 COMPREHEN METABOLIC PANEL: CPT | Performed by: EMERGENCY MEDICINE

## 2020-09-29 PROCEDURE — 36415 COLL VENOUS BLD VENIPUNCTURE: CPT | Performed by: EMERGENCY MEDICINE

## 2020-09-29 PROCEDURE — 71045 X-RAY EXAM CHEST 1 VIEW: CPT

## 2020-09-29 PROCEDURE — 85730 THROMBOPLASTIN TIME PARTIAL: CPT | Performed by: EMERGENCY MEDICINE

## 2020-09-29 PROCEDURE — 96374 THER/PROPH/DIAG INJ IV PUSH: CPT

## 2020-09-29 PROCEDURE — 70450 CT HEAD/BRAIN W/O DYE: CPT

## 2020-09-29 PROCEDURE — 85610 PROTHROMBIN TIME: CPT | Performed by: EMERGENCY MEDICINE

## 2020-09-29 PROCEDURE — 73030 X-RAY EXAM OF SHOULDER: CPT

## 2020-09-29 PROCEDURE — 93005 ELECTROCARDIOGRAM TRACING: CPT

## 2020-09-29 PROCEDURE — 74176 CT ABD & PELVIS W/O CONTRAST: CPT

## 2020-09-29 PROCEDURE — 99285 EMERGENCY DEPT VISIT HI MDM: CPT | Performed by: EMERGENCY MEDICINE

## 2020-09-29 PROCEDURE — 72125 CT NECK SPINE W/O DYE: CPT

## 2020-09-29 RX ADMIN — MORPHINE SULFATE 2 MG: 2 INJECTION, SOLUTION INTRAMUSCULAR; INTRAVENOUS at 15:16

## 2020-09-29 NOTE — ED PROVIDER NOTES
History  Chief Complaint   Patient presents with    Fall     pt c/o bilateral hip pain, left shoulder and head after falling out of chair while dizzy yesterday afternoon hitting head against sofa and landing on left side  denies loc/travel/sob/cough/fevers/n/v/d     Patient is an 70-year-old male presents the emergency department complaining of bilateral hip pain and pain in the left shoulder and had after he fell out of a chair yesterday  Patient denies any loss of consciousness  Patient is on Coumadin with history of paroxysmal atrial fibrillation and Parkinson's  Patient complains of pain in the hips when bearing weight but he is able to bear weight  History provided by:  Patient and EMS personnel  Fall   Mechanism of injury: fall    Injury location:  Pelvis, head/neck and shoulder/arm  Head/neck injury location:  Head  Shoulder/arm injury location:  L shoulder  Pelvic injury location:  L hip and R hip  Time since incident:  1 day  Prior to arrival data:     Patient ambulatory at scene: yes      Loss of consciousness: no      Amnesic to event: no    Associated symptoms: no abdominal pain, no chest pain, no headaches, no nausea and no vomiting        None       History reviewed  No pertinent past medical history  Past Surgical History:   Procedure Laterality Date    VASECTOMY         History reviewed  No pertinent family history  I have reviewed and agree with the history as documented  E-Cigarette/Vaping    E-Cigarette Use Never User      E-Cigarette/Vaping Substances     Social History     Tobacco Use    Smoking status: Former Smoker    Smokeless tobacco: Never Used   Substance Use Topics    Alcohol use: Not Currently    Drug use: Never       Review of Systems   Constitutional: Negative for activity change, appetite change, chills, fatigue and fever  HENT: Negative for congestion, ear pain, rhinorrhea and sore throat  Eyes: Negative for discharge, redness and visual disturbance  Respiratory: Negative for cough, chest tightness, shortness of breath and wheezing  Cardiovascular: Negative for chest pain and palpitations  Gastrointestinal: Negative for abdominal pain, constipation, diarrhea, nausea and vomiting  Endocrine: Negative for polydipsia and polyuria  Genitourinary: Negative for difficulty urinating, dysuria, frequency, hematuria and urgency  Musculoskeletal: Negative for arthralgias and myalgias  Bilateral hip pain lower back pain and left shoulder pain   Skin: Negative for color change, pallor and rash  Neurological: Negative for dizziness, weakness, light-headedness, numbness and headaches  Hematological: Negative for adenopathy  Does not bruise/bleed easily  All other systems reviewed and are negative  Physical Exam  Physical Exam  Vitals signs and nursing note reviewed  Constitutional:       Appearance: He is well-developed  HENT:      Nose: Nose normal       Mouth/Throat:      Pharynx: No oropharyngeal exudate  Eyes:      General: No scleral icterus  Conjunctiva/sclera: Conjunctivae normal       Pupils: Pupils are equal, round, and reactive to light  Neck:      Musculoskeletal: Normal range of motion and neck supple  Vascular: No JVD  Trachea: No tracheal deviation  Cardiovascular:      Rate and Rhythm: Normal rate and regular rhythm  Heart sounds: Normal heart sounds  No murmur  Pulmonary:      Effort: Pulmonary effort is normal  No respiratory distress  Breath sounds: Normal breath sounds  No wheezing or rales  Abdominal:      General: Bowel sounds are normal       Palpations: Abdomen is soft  Tenderness: There is no abdominal tenderness  There is no guarding  Musculoskeletal:      Left shoulder: He exhibits tenderness and pain  He exhibits normal range of motion and no deformity  Right hip: He exhibits decreased range of motion, decreased strength and tenderness        Left hip: He exhibits decreased range of motion, decreased strength and tenderness  Lumbar back: He exhibits tenderness, pain and spasm  Skin:     General: Skin is warm and dry  Neurological:      Mental Status: He is alert and oriented to person, place, and time  Cranial Nerves: No cranial nerve deficit  Sensory: No sensory deficit  Motor: No abnormal muscle tone        Comments: 5/5 motor, nl sens   Psychiatric:         Behavior: Behavior normal          Vital Signs  ED Triage Vitals [09/29/20 1427]   Temperature Pulse Respirations Blood Pressure SpO2   (!) 96 9 °F (36 1 °C) 66 18 119/59 94 %      Temp Source Heart Rate Source Patient Position - Orthostatic VS BP Location FiO2 (%)   Temporal Monitor Lying Right arm --      Pain Score       Worst Possible Pain           Vitals:    09/29/20 1427 09/29/20 1545   BP: 119/59 108/70   Pulse: 66 64   Patient Position - Orthostatic VS: Lying          Visual Acuity      ED Medications  Medications   morphine injection 2 mg (2 mg Intravenous Given 9/29/20 1516)       Diagnostic Studies  Results Reviewed     Procedure Component Value Units Date/Time    Protime-INR [[id number]]  (Abnormal) Collected:  09/29/20 1514    Lab Status:  Final result Specimen:  Blood from Line, Venous Updated:  09/29/20 1547     Protime 23 7 seconds      INR 2 17    APTT [[id number]]  (Normal) Collected:  09/29/20 1514    Lab Status:  Final result Specimen:  Blood from Line, Venous Updated:  09/29/20 1547     PTT 36 seconds     Comprehensive metabolic panel [[id number]]  (Abnormal) Collected:  09/29/20 1514    Lab Status:  Final result Specimen:  Blood from Line, Venous Updated:  09/29/20 1541     Sodium 140 mmol/L      Potassium 4 2 mmol/L      Chloride 104 mmol/L      CO2 31 mmol/L      ANION GAP 5 mmol/L      BUN 18 mg/dL      Creatinine 1 20 mg/dL      Glucose 95 mg/dL      Calcium 9 2 mg/dL      Corrected Calcium 9 8 mg/dL      AST 32 U/L      ALT 43 U/L      Alkaline Phosphatase 66 U/L Total Protein 6 7 g/dL      Albumin 3 2 g/dL      Total Bilirubin 0 60 mg/dL      eGFR 54 ml/min/1 73sq m     Narrative:       National Kidney Disease Foundation guidelines for Chronic Kidney Disease (CKD):     Stage 1 with normal or high GFR (GFR > 90 mL/min/1 73 square meters)    Stage 2 Mild CKD (GFR = 60-89 mL/min/1 73 square meters)    Stage 3A Moderate CKD (GFR = 45-59 mL/min/1 73 square meters)    Stage 3B Moderate CKD (GFR = 30-44 mL/min/1 73 square meters)    Stage 4 Severe CKD (GFR = 15-29 mL/min/1 73 square meters)    Stage 5 End Stage CKD (GFR <15 mL/min/1 73 square meters)  Note: GFR calculation is accurate only with a steady state creatinine    CBC and differential [[id number]]  (Abnormal) Collected:  09/29/20 1514    Lab Status:  Final result Specimen:  Blood from Line, Venous Updated:  09/29/20 1524     WBC 7 55 Thousand/uL      RBC 5 59 Million/uL      Hemoglobin 16 5 g/dL      Hematocrit 50 9 %      MCV 91 fL      MCH 29 5 pg      MCHC 32 4 g/dL      RDW 13 2 %      MPV 8 9 fL      Platelets 588 Thousands/uL      nRBC 0 /100 WBCs      Neutrophils Relative 70 %      Immat GRANS % 1 %      Lymphocytes Relative 16 %      Monocytes Relative 9 %      Eosinophils Relative 4 %      Basophils Relative 0 %      Neutrophils Absolute 5 34 Thousands/µL      Immature Grans Absolute 0 05 Thousand/uL      Lymphocytes Absolute 1 20 Thousands/µL      Monocytes Absolute 0 66 Thousand/µL      Eosinophils Absolute 0 27 Thousand/µL      Basophils Absolute 0 03 Thousands/µL                  CT head without contrast   Final Result by Redd Mike MD (09/29 9499)   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      No acute intracranial abnormality  Workstation performed: [id number]         CT cervical spine without contrast   Final Result by Bennie Rodas MD (09/29 5759)      No cervical spine fracture or traumatic malalignment  Workstation performed: [id number]         CT abdomen pelvis wo contrast   Final Result by Britton Lozoya MD (09/29 1606)      1  No findings of acute traumatic injury in the abdomen or pelvis  2   3 2 cm infrarenal abdominal aortic aneurysm  3   Markedly enlarged prostate gland  Workstation performed: [id number]         CT recon only lumbar spine   Final Result by Gilda Araujo MD (09/29 1608)   Advanced multilevel degenerative spondylosis  Mild dextroscoliosis      No fracture or traumatic subluxation  Small distal infrarenal AAA            Workstation performed: [id number]         XR shoulder 2+ views LEFT   Final Result by Betzy Varner MD (09/29 1542)      No acute osseous abnormality  Workstation performed: [id number]         XR chest 1 view   Final Result by Bryson Mai DO (09/29 1631)      No acute cardiopulmonary disease  Workstation performed: [id number]                    Procedures  ECG 12 Lead Documentation Only    Date/Time: 9/29/2020 2:35 PM  Performed by: Nadir Cali DO  Authorized by: Nadir Cali DO     ECG reviewed by me, the ED Provider: yes    Patient location:  ED  Quality:     Tracing quality:  Limited by artifact  Rate:     ECG rate:  71    ECG rate assessment: normal    Rhythm:     Rhythm: sinus rhythm    Ectopy:     Ectopy: PVCs      PVCs:  Frequent  QRS:     QRS axis:  Left    QRS intervals:  Normal  Conduction:     Conduction: normal    ST segments:     ST segments:  Non-specific  T waves:     T waves: non-specific               ED Course                                       MDM  Number of Diagnoses or Management Options  Contusion, hip: new and requires workup  Fall, initial encounter: new and requires workup  Diagnosis management comments: Patient remained clinically hemodynamically stable in the emergency department he is ambulatory and neurovascularly intact    Symptoms most consistent with contusion and strain sprain of the lower back and hip area and shoulder and head contusion no evidence of acute traumatic or intracranial injury on workup in the ED for trauma with CT scans  No evidence of acute fracture dislocation  Advised rest and supportive care and prompt follow-up with primary physician for further evaluation treatment obtain test results return precautions and anticipatory guidance discussed  Amount and/or Complexity of Data Reviewed  Clinical lab tests: ordered and reviewed  Tests in the radiology section of CPT®: ordered and reviewed  Tests in the medicine section of CPT®: ordered and reviewed  Decide to obtain previous medical records or to obtain history from someone other than the patient: yes  Review and summarize past medical records: yes  Independent visualization of images, tracings, or specimens: yes    Risk of Complications, Morbidity, and/or Mortality  Presenting problems: moderate  Diagnostic procedures: moderate  Management options: moderate    Patient Progress  Patient progress: stable      Disposition  Final diagnoses:   Fall, initial encounter   Contusion, hip     Time reflects when diagnosis was documented in both MDM as applicable and the Disposition within this note     Time User Action Codes Description Comment    9/29/2020  4:28 PM Pj Chandler Add [W19  KRNZ] Fall, initial encounter     9/29/2020  4:28 PM Pj Chandler Add [S70 00XA] Contusion, hip       ED Disposition     ED Disposition Condition Date/Time Comment    Discharge Stable Tue Sep 29, 2020  4:28 PM Jeanine Parcel discharge to home/self care  Follow-up Information     Follow up With Specialties Details Why Contact Info    Juanjo Alvarado DO Family Medicine Schedule an appointment as soon as possible for a visit in 2 days  [phone]  [phone]            Patient's Medications    No medications on file     No discharge procedures on file      PDMP Review     None          ED Provider  Electronically Signed by           Heber Ortega DO  09/29/20 3610

## 2020-10-03 LAB
ATRIAL RATE: 67 BPM
P AXIS: 53 DEGREES
PR INTERVAL: 172 MS
QRS AXIS: -28 DEGREES
QRSD INTERVAL: 80 MS
QT INTERVAL: 384 MS
QTC INTERVAL: 417 MS
T WAVE AXIS: 101 DEGREES
VENTRICULAR RATE: 71 BPM

## 2020-10-03 PROCEDURE — 93010 ELECTROCARDIOGRAM REPORT: CPT | Performed by: INTERNAL MEDICINE

## 2020-12-09 ENCOUNTER — TRANSCRIBE ORDERS (OUTPATIENT)
Dept: ADMINISTRATIVE | Facility: HOSPITAL | Age: 85
End: 2020-12-09

## 2020-12-09 DIAGNOSIS — I71.4 ABDOMINAL AORTIC ANEURYSM WITHOUT RUPTURE (HCC): Primary | ICD-10-CM

## 2020-12-11 ENCOUNTER — HOSPITAL ENCOUNTER (OUTPATIENT)
Dept: NON INVASIVE DIAGNOSTICS | Facility: HOSPITAL | Age: 85
Discharge: HOME | End: 2020-12-11
Payer: COMMERCIAL

## 2020-12-11 DIAGNOSIS — I71.4 ABDOMINAL AORTIC ANEURYSM WITHOUT RUPTURE (HCC): ICD-10-CM

## 2020-12-11 PROCEDURE — 93978 VASCULAR STUDY: CPT

## 2020-12-11 PROCEDURE — 93922 UPR/L XTREMITY ART 2 LEVELS: CPT | Performed by: SURGERY

## 2020-12-11 PROCEDURE — 93978 VASCULAR STUDY: CPT | Performed by: SURGERY

## 2020-12-11 PROCEDURE — 93923 UPR/LXTR ART STDY 3+ LVLS: CPT

## 2021-01-19 ENCOUNTER — IMMUNIZATIONS (OUTPATIENT)
Dept: FAMILY MEDICINE CLINIC | Facility: HOSPITAL | Age: 86
End: 2021-01-19

## 2021-01-19 DIAGNOSIS — Z23 ENCOUNTER FOR IMMUNIZATION: Primary | ICD-10-CM

## 2021-01-19 PROCEDURE — 91301 SARS-COV-2 / COVID-19 MRNA VACCINE (MODERNA) 100 MCG: CPT | Performed by: PHARMACIST

## 2021-01-19 PROCEDURE — 0011A SARS-COV-2 / COVID-19 MRNA VACCINE (MODERNA) 100 MCG: CPT | Performed by: PHARMACIST

## 2021-02-15 ENCOUNTER — IMMUNIZATIONS (OUTPATIENT)
Dept: FAMILY MEDICINE CLINIC | Facility: HOSPITAL | Age: 86
End: 2021-02-15

## 2021-02-15 DIAGNOSIS — Z23 ENCOUNTER FOR IMMUNIZATION: Primary | ICD-10-CM

## 2021-02-15 PROCEDURE — 0012A SARS-COV-2 / COVID-19 MRNA VACCINE (MODERNA) 100 MCG: CPT

## 2021-02-15 PROCEDURE — 91301 SARS-COV-2 / COVID-19 MRNA VACCINE (MODERNA) 100 MCG: CPT

## 2021-05-11 ENCOUNTER — TRANSCRIBE ORDERS (OUTPATIENT)
Dept: CARDIOLOGY CLINIC | Facility: CLINIC | Age: 86
End: 2021-05-11

## 2021-05-11 DIAGNOSIS — R10.30 INGUINAL PAIN, UNSPECIFIED LATERALITY: Primary | ICD-10-CM

## 2021-05-17 ENCOUNTER — TELEPHONE (OUTPATIENT)
Dept: PAIN MEDICINE | Facility: CLINIC | Age: 86
End: 2021-05-17

## 2021-07-26 RX ORDER — DULOXETIN HYDROCHLORIDE 30 MG/1
CAPSULE, DELAYED RELEASE ORAL
COMMUNITY
Start: 2021-06-21

## 2021-07-26 RX ORDER — WARFARIN SODIUM 5 MG/1
TABLET ORAL
COMMUNITY

## 2021-07-26 RX ORDER — CARVEDILOL 6.25 MG/1
TABLET ORAL
COMMUNITY

## 2021-07-26 RX ORDER — GABAPENTIN 100 MG/1
CAPSULE ORAL
COMMUNITY
Start: 2021-04-21

## 2021-07-26 RX ORDER — ATORVASTATIN CALCIUM 40 MG/1
TABLET, FILM COATED ORAL
COMMUNITY
Start: 2021-06-28

## 2021-07-26 RX ORDER — OMEPRAZOLE 20 MG/1
20 TABLET, DELAYED RELEASE ORAL
COMMUNITY
Start: 2021-03-09

## 2021-07-26 RX ORDER — SPIRONOLACTONE 25 MG/1
TABLET ORAL
COMMUNITY

## 2021-07-26 RX ORDER — SUCRALFATE 1 G/1
TABLET ORAL
COMMUNITY
Start: 2021-05-06

## 2021-07-26 RX ORDER — SACUBITRIL AND VALSARTAN 24; 26 MG/1; MG/1
1 TABLET, FILM COATED ORAL DAILY
COMMUNITY
Start: 2021-04-21

## 2021-07-28 ENCOUNTER — CONSULT (OUTPATIENT)
Dept: UROLOGY | Facility: CLINIC | Age: 86
End: 2021-07-28
Payer: COMMERCIAL

## 2021-07-28 VITALS
DIASTOLIC BLOOD PRESSURE: 70 MMHG | HEART RATE: 86 BPM | BODY MASS INDEX: 21.03 KG/M2 | HEIGHT: 69 IN | SYSTOLIC BLOOD PRESSURE: 110 MMHG | WEIGHT: 142 LBS

## 2021-07-28 DIAGNOSIS — R10.30 INGUINAL PAIN, UNSPECIFIED LATERALITY: ICD-10-CM

## 2021-07-28 DIAGNOSIS — K40.90 NON-RECURRENT UNILATERAL INGUINAL HERNIA WITHOUT OBSTRUCTION OR GANGRENE: Primary | ICD-10-CM

## 2021-07-28 PROCEDURE — 1036F TOBACCO NON-USER: CPT | Performed by: UROLOGY

## 2021-07-28 PROCEDURE — 99203 OFFICE O/P NEW LOW 30 MIN: CPT | Performed by: UROLOGY

## 2021-07-28 NOTE — PATIENT INSTRUCTIONS
Use scrotal support such as a jockstrap for comfort  We will refer you to general surgery and they can discuss whether this should be treated or not

## 2021-07-28 NOTE — PROGRESS NOTES
[address] [facility]  [facility]  [address] [facility] [place]  [place], [address] [place] [address]  [phone]  www  [email or web address]  [email or web address]      NAME: Bartolo Torres  AGE: 80 y o  SEX: male  : 1933   MRN: [id number]    DATE: 2021  TIME: 2:22 PM    Assessment and Plan:    Right inguinal hernia:  Referred to General surgery  Recommend scrotal support  BPH without symptoms:  No need for treatment  From my standpoint, he is doing okay  I can see him back as needed  Chief Complaint     Chief Complaint   Patient presents with    Groin Pain       History of Present Illness     New patient office visit:  27-year-old man  Complains of right groin pain  CT scan  2020 shows a very large prostate gland, no hydronephrosis or stones or masses  He used to see Dr Palomo Krause for kidney stones, and had ureteroscopy a couple of times  Had retention after the last procedure, 1 5 liters  This resolved , and was a few years ago  He thinks he has a right inguinal hernia  He feels like he has been keeping the groin is been going on for about 2 years  No voiding problems  The following portions of the patient's history were reviewed and updated as appropriate: allergies, current medications, past family history, past medical history, past social history, past surgical history and problem list   History reviewed  No pertinent past medical history  Past Surgical History:   Procedure Laterality Date    VASECTOMY       shoulder  Review of Systems   Review of Systems   Genitourinary: Positive for scrotal swelling  Negative for difficulty urinating  Active Problem List   There is no problem list on file for this patient  Objective   /70   Pulse 86   Ht 5' 9" (1 753 m)   Wt 64 4 kg (142 lb)   BMI 20 97 kg/m²     Physical Exam  Constitutional:       Appearance: Normal appearance  He is obese  HENT:      Head: Normocephalic and atraumatic     Eyes: Extraocular Movements: Extraocular movements intact  Pulmonary:      Effort: Pulmonary effort is normal    Abdominal:      General: There is no distension  Palpations: Abdomen is soft  There is no mass  Tenderness: There is no abdominal tenderness  There is no right CVA tenderness, left CVA tenderness, guarding or rebound  Hernia: A hernia is present  Comments: Right inguinal hernia   Genitourinary:     Penis: Normal        Testes: Normal       Prostate: Normal       Rectum: Normal       Comments: Normal phallus, testicles are descended bilaterally and he has a right inguinal hernia  Rectal exam reveals normal tone no masses and his prostate is about 60-70 g, benign nodularity  Musculoskeletal:         General: No swelling  Cervical back: Normal range of motion  Comments: Rigid, slow movements   Skin:     Coloration: Skin is not jaundiced or pale  Neurological:      General: No focal deficit present  Mental Status: He is alert and oriented to person, place, and time  Psychiatric:         Mood and Affect: Mood normal          Behavior: Behavior normal          Thought Content:  Thought content normal          Judgment: Judgment normal              Current Medications     Current Outpatient Medications:     Aspirin Buf,CaCarb-MgCarb-MgO, 81 MG TABS, Take 81 mg by mouth, Disp: , Rfl:     atorvastatin (LIPITOR) 40 mg tablet, TAKE 1 TABLET BY MOUTH EVERY DAY, Disp: , Rfl:     carvedilol (COREG) 6 25 mg tablet, Take by mouth, Disp: , Rfl:     Cholecalciferol 125 MCG (5000 UT) TABS, Take by mouth, Disp: , Rfl:     DULoxetine (CYMBALTA) 30 mg delayed release capsule, TAKE 1 CAPSULE BY MOUTH EVERY DAY, Disp: , Rfl:     Entresto 24-26 MG TABS, Take 1 tablet by mouth daily, Disp: , Rfl:     gabapentin (NEURONTIN) 100 mg capsule, , Disp: , Rfl:     Multiple Vitamin (MULTIVITAMINS PO), Take by mouth, Disp: , Rfl:     NITROGLYCERIN PO, , Disp: , Rfl:     omeprazole (PriLOSEC OTC) 20 MG tablet, Take 20 mg by mouth, Disp: , Rfl:     warfarin (Coumadin) 5 mg tablet, , Disp: , Rfl:     carbidopa-levodopa (SINEMET)  mg per tablet, TAKE 1 TABLET BY MOUTH THREE TIMES A DAY (Patient not taking: Reported on 7/28/2021), Disp: , Rfl:     spironolactone (ALDACTONE) 25 mg tablet, Take by mouth (Patient not taking: Reported on 7/28/2021), Disp: , Rfl:     sucralfate (CARAFATE) 1 g tablet, , Disp: , Rfl:         Unknown MD Scott

## 2021-08-02 ENCOUNTER — TELEPHONE (OUTPATIENT)
Dept: SURGERY | Facility: CLINIC | Age: 86
End: 2021-08-02

## 2021-08-03 NOTE — TELEPHONE ENCOUNTER
Patient called to ask when general surgery would call in regards to the appointment referral  I advised her a call was made, she had no idea  She said they will stay by the phone for the next call

## 2021-08-03 NOTE — TELEPHONE ENCOUNTER
I spoke with patient's wife Aga Diaz and they would like a general surgeon that is affiliated with [facility] in [facility]  Patient's wife will be contacting there insurance in regards whom her  Mehreen Branham can see

## 2021-11-19 DIAGNOSIS — R19.09 OTHER INTRA-ABDOMINAL AND PELVIC SWELLING, MASS AND LUMP: ICD-10-CM

## 2021-11-22 ENCOUNTER — HOSPITAL ENCOUNTER (OUTPATIENT)
Dept: ULTRASOUND IMAGING | Facility: HOSPITAL | Age: 86
Discharge: HOME | End: 2021-11-22
Attending: STUDENT IN AN ORGANIZED HEALTH CARE EDUCATION/TRAINING PROGRAM
Payer: COMMERCIAL

## 2021-11-22 DIAGNOSIS — R19.09 OTHER INTRA-ABDOMINAL AND PELVIC SWELLING, MASS AND LUMP: ICD-10-CM

## 2021-11-22 PROCEDURE — 76705 ECHO EXAM OF ABDOMEN: CPT

## 2021-12-02 ENCOUNTER — APPOINTMENT (OUTPATIENT)
Dept: LAB | Facility: HOSPITAL | Age: 86
End: 2021-12-02
Attending: STUDENT IN AN ORGANIZED HEALTH CARE EDUCATION/TRAINING PROGRAM
Payer: COMMERCIAL

## 2021-12-02 DIAGNOSIS — R19.09 RIGHT GROIN MASS: ICD-10-CM

## 2021-12-02 LAB
ANION GAP SERPL CALCULATED.3IONS-SCNC: 5 MMOL/L (ref 4–13)
BUN SERPL-MCNC: 14 MG/DL (ref 5–25)
CALCIUM SERPL-MCNC: 9.2 MG/DL (ref 8.3–10.1)
CHLORIDE SERPL-SCNC: 103 MMOL/L (ref 100–108)
CO2 SERPL-SCNC: 32 MMOL/L (ref 21–32)
CREAT SERPL-MCNC: 1.12 MG/DL (ref 0.6–1.3)
GFR SERPL CREATININE-BSD FRML MDRD: 58 ML/MIN/1.73SQ M
GLUCOSE SERPL-MCNC: 96 MG/DL (ref 65–140)
POTASSIUM SERPL-SCNC: 3.9 MMOL/L (ref 3.5–5.3)
SODIUM SERPL-SCNC: 140 MMOL/L (ref 136–145)

## 2021-12-02 PROCEDURE — 36415 COLL VENOUS BLD VENIPUNCTURE: CPT

## 2021-12-02 PROCEDURE — 80048 BASIC METABOLIC PNL TOTAL CA: CPT

## 2021-12-07 ENCOUNTER — HOSPITAL ENCOUNTER (OUTPATIENT)
Dept: CT IMAGING | Facility: HOSPITAL | Age: 86
Discharge: HOME | End: 2021-12-07
Attending: STUDENT IN AN ORGANIZED HEALTH CARE EDUCATION/TRAINING PROGRAM
Payer: COMMERCIAL

## 2021-12-07 DIAGNOSIS — R19.09 OTHER INTRA-ABDOMINAL AND PELVIC SWELLING, MASS AND LUMP: ICD-10-CM

## 2021-12-07 PROCEDURE — 72193 CT PELVIS W/DYE: CPT

## 2021-12-07 RX ADMIN — IOHEXOL 85 ML: 350 INJECTION, SOLUTION INTRAVENOUS at 13:19

## 2023-03-29 ENCOUNTER — HOSPITAL ENCOUNTER (EMERGENCY)
Facility: HOSPITAL | Age: 88
End: 2023-03-29
Attending: EMERGENCY MEDICINE

## 2023-03-29 ENCOUNTER — APPOINTMENT (EMERGENCY)
Dept: CT IMAGING | Facility: HOSPITAL | Age: 88
End: 2023-03-29

## 2023-03-29 ENCOUNTER — APPOINTMENT (EMERGENCY)
Dept: RADIOLOGY | Facility: HOSPITAL | Age: 88
End: 2023-03-29

## 2023-03-29 ENCOUNTER — HOSPITAL ENCOUNTER (INPATIENT)
Facility: HOSPITAL | Age: 88
LOS: 5 days | Discharge: HOME HEALTH | End: 2023-04-03
Attending: SURGERY | Admitting: SURGERY

## 2023-03-29 VITALS
BODY MASS INDEX: 21.49 KG/M2 | OXYGEN SATURATION: 95 % | SYSTOLIC BLOOD PRESSURE: 119 MMHG | HEART RATE: 64 BPM | WEIGHT: 145.5 LBS | DIASTOLIC BLOOD PRESSURE: 58 MMHG | TEMPERATURE: 97.4 F | RESPIRATION RATE: 20 BRPM

## 2023-03-29 DIAGNOSIS — I61.9 INTRAPARENCHYMAL HEMORRHAGE OF BRAIN (HCC): ICD-10-CM
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
## 2023-03-29 DIAGNOSIS — S06.5XAA SDH (SUBDURAL HEMATOMA) (HCC): ICD-10-CM

## 2023-03-29 DIAGNOSIS — S00.83XA CONTUSION OF FACE, INITIAL ENCOUNTER: Primary | ICD-10-CM
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
## 2023-03-29 DIAGNOSIS — I60.9 SUBARACHNOID HEMORRHAGE (HCC): ICD-10-CM

## 2023-03-29 DIAGNOSIS — S06.5XAA SUBDURAL HEMATOMA (HCC): ICD-10-CM

## 2023-03-29 DIAGNOSIS — I60.9 SAH (SUBARACHNOID HEMORRHAGE) (HCC): ICD-10-CM

## 2023-03-29 LAB
ABO GROUP BLD: NORMAL
ANION GAP SERPL CALCULATED.3IONS-SCNC: 4 MMOL/L (ref 4–13)
APTT PPP: 44 SECONDS (ref 23–37)
BASOPHILS # BLD AUTO: 0.03 THOUSANDS/ÂΜL (ref 0–0.1)
BASOPHILS NFR BLD AUTO: 0 % (ref 0–1)
BLD GP AB SCN SERPL QL: NEGATIVE
BUN SERPL-MCNC: 13 MG/DL (ref 5–25)
CALCIUM SERPL-MCNC: 9.2 MG/DL (ref 8.4–10.2)
CHLORIDE SERPL-SCNC: 104 MMOL/L (ref 96–108)
CO2 SERPL-SCNC: 31 MMOL/L (ref 21–32)
CREAT SERPL-MCNC: 0.89 MG/DL (ref 0.6–1.3)
EOSINOPHIL # BLD AUTO: 0.22 THOUSAND/ÂΜL (ref 0–0.61)
EOSINOPHIL NFR BLD AUTO: 3 % (ref 0–6)
ERYTHROCYTE [DISTWIDTH] IN BLOOD BY AUTOMATED COUNT: 13.6 % (ref 11.6–15.1)
GFR SERPL CREATININE-BSD FRML MDRD: 75 ML/MIN/1.73SQ M
GLUCOSE SERPL-MCNC: 97 MG/DL (ref 65–140)
HCT VFR BLD AUTO: 49.3 % (ref 36.5–49.3)
HGB BLD-MCNC: 16.3 G/DL (ref 12–17)
IMM GRANULOCYTES # BLD AUTO: 0.02 THOUSAND/UL (ref 0–0.2)
IMM GRANULOCYTES NFR BLD AUTO: 0 % (ref 0–2)
INR PPP: 1.2 (ref 0.84–1.19)
INR PPP: 3.37 (ref 0.84–1.19)
LYMPHOCYTES # BLD AUTO: 1.18 THOUSANDS/ÂΜL (ref 0.6–4.47)
LYMPHOCYTES NFR BLD AUTO: 14 % (ref 14–44)
MCH RBC QN AUTO: 30.6 PG (ref 26.8–34.3)
MCHC RBC AUTO-ENTMCNC: 33.1 G/DL (ref 31.4–37.4)
MCV RBC AUTO: 93 FL (ref 82–98)
MONOCYTES # BLD AUTO: 0.58 THOUSAND/ÂΜL (ref 0.17–1.22)
MONOCYTES NFR BLD AUTO: 7 % (ref 4–12)
NEUTROPHILS # BLD AUTO: 6.6 THOUSANDS/ÂΜL (ref 1.85–7.62)
NEUTS SEG NFR BLD AUTO: 76 % (ref 43–75)
NRBC BLD AUTO-RTO: 0 /100 WBCS
PLATELET # BLD AUTO: 156 THOUSANDS/UL (ref 149–390)
PMV BLD AUTO: 8.7 FL (ref 8.9–12.7)
POTASSIUM SERPL-SCNC: 4.2 MMOL/L (ref 3.5–5.3)
PROTHROMBIN TIME: 15.4 SECONDS (ref 11.6–14.5)
PROTHROMBIN TIME: 34.1 SECONDS (ref 11.6–14.5)
RBC # BLD AUTO: 5.32 MILLION/UL (ref 3.88–5.62)
RH BLD: POSITIVE
SODIUM SERPL-SCNC: 139 MMOL/L (ref 135–147)
SPECIMEN EXPIRATION DATE: NORMAL
WBC # BLD AUTO: 8.63 THOUSAND/UL (ref 4.31–10.16)

## 2023-03-29 RX ORDER — ONDANSETRON 2 MG/ML
4 INJECTION INTRAMUSCULAR; INTRAVENOUS EVERY 4 HOURS PRN
Status: DISCONTINUED | OUTPATIENT
Start: 2023-03-29 | End: 2023-04-03 | Stop reason: HOSPADM

## 2023-03-29 RX ORDER — ACETAMINOPHEN 325 MG/1
650 TABLET ORAL EVERY 6 HOURS PRN
Status: DISCONTINUED | OUTPATIENT
Start: 2023-03-29 | End: 2023-04-03 | Stop reason: HOSPADM

## 2023-03-29 RX ORDER — AMOXICILLIN 250 MG
2 CAPSULE ORAL 2 TIMES DAILY
Status: DISCONTINUED | OUTPATIENT
Start: 2023-03-29 | End: 2023-04-03 | Stop reason: HOSPADM

## 2023-03-29 RX ORDER — ATORVASTATIN CALCIUM 40 MG/1
40 TABLET, FILM COATED ORAL DAILY
Status: DISCONTINUED | OUTPATIENT
Start: 2023-03-30 | End: 2023-04-03 | Stop reason: HOSPADM

## 2023-03-29 RX ORDER — CHLORHEXIDINE GLUCONATE 0.12 MG/ML
15 RINSE ORAL EVERY 12 HOURS SCHEDULED
Status: DISCONTINUED | OUTPATIENT
Start: 2023-03-29 | End: 2023-03-31

## 2023-03-29 RX ORDER — DULOXETIN HYDROCHLORIDE 30 MG/1
30 CAPSULE, DELAYED RELEASE ORAL DAILY
Status: DISCONTINUED | OUTPATIENT
Start: 2023-03-30 | End: 2023-04-03 | Stop reason: HOSPADM

## 2023-03-29 RX ORDER — PANTOPRAZOLE SODIUM 20 MG/1
20 TABLET, DELAYED RELEASE ORAL
Status: DISCONTINUED | OUTPATIENT
Start: 2023-03-30 | End: 2023-04-03 | Stop reason: HOSPADM

## 2023-03-29 RX ADMIN — PHYTONADIONE 10 MG: 10 INJECTION, EMULSION INTRAMUSCULAR; INTRAVENOUS; SUBCUTANEOUS at 14:25

## 2023-03-29 RX ADMIN — CHLORHEXIDINE GLUCONATE 0.12% ORAL RINSE 15 ML: 1.2 LIQUID ORAL at 20:55

## 2023-03-29 RX ADMIN — LEVETIRACETAM 500 MG: 100 INJECTION, SOLUTION INTRAVENOUS at 20:55

## 2023-03-29 RX ADMIN — Medication 1500 UNITS: at 14:09

## 2023-03-29 RX ADMIN — CARBIDOPA AND LEVODOPA 1 TABLET: 10; 100 TABLET ORAL at 20:55

## 2023-03-29 NOTE — ED NOTES
TO: --[facility] ED  DX: Contusion of face, initial encounter  EMS Tx Reason: trauma  Accepting: Dr  [place]  Transport (ALS/BLS, etc):  ALS  P/U Time: 1500 / [facility]  Number for Report:  [phone]     Victor Manuel Castillo, RN  03/29/23 [address] RN  03/29/23 6506

## 2023-03-29 NOTE — ED NOTES
Pt transported to [facility] ED  No s/s of distress, VS stable, A&Ox4  Report called to [facility] ED        Brendan Mathias RN  03/29/23 6528

## 2023-03-29 NOTE — CONSULTS
[address] [facility]  Consult: Critical Care  Name: Gina Clement  MRN: [id number]  Unit/Bed#: ICU 03 I Date of Admission: 3/29/2023   Date of Service: 3/30/2023 I Hospital Day: 1      Consults  Assessment/Plan   Neuro:   · Diagnosis: b/l Subdural hematoma, left intraparenchymal hematoma, right subarachnoid hemorrhage   · Plan: 3/29 CTH- Moderate right and small left subdural hematomas, small right greater than left parenchymal hematomas and mild right subarachnoid hemorrhage  · q1h neuro checks   · Keppra for seizure prophylaxis  · Maintain seizure precautions  · Follow-up a m  CT head  · Neurosurgery consulted  · Diagnosis: Acute pain  · Plan: As needed Tylenol  · Diagnosis: Perkinson's  · Plan: Continue Sinemet      CV:   · Diagnosis: atrial fibrillation  · Plan: Home meds: Coumadin  · Reversed with Kcentra and vitamin K  · Monitor on telemetry  · Rate controlled       Pulm:  No active issues    GI:   · Diagnosis: GERD   · Plan: Continue home dose PPI      :   No active issues    F/E/N:   · Replete electrolytes as needed  · N p o       Heme/Onc:   · Diagnosis: DVT ppx  · Plan: Hold chemical prophylaxis in setting of TBI  · Maintain SCDs      Endo:   No active issues    ID:   No active issues    MSK/Skin:   · Diagnosis: Ambulatory dysfunction  · Plan: PT/OT      Disposition: Critical care     History of Present Illness     HPI: Ria Dy is a 80 y o  past medical history of Parkinson's, atrial fibrillation on Coumadin  Patient presented to [address] [place] [address] ED complaining of fall on Monday 3/27  CT head completed showing bilateral subdural hematoma, IPH, right SAH  Transferred to [facility]  History obtained from chart review and the patient    Review of Systems  Historical Information   Past Medical History:  No date: Parkinson disease [facility]) Past Surgical History:  No date: VASECTOMY   Current Outpatient Medications   Medication Instructions   • Aspirin Buf,CaCarb-MgCarb-MgO, 81 MG TABS 81 mg, Oral • atorvastatin (LIPITOR) 40 mg tablet TAKE 1 TABLET BY MOUTH EVERY DAY   • carbidopa-levodopa (SINEMET)  mg per tablet TAKE 1 TABLET BY MOUTH THREE TIMES A DAY   • carvedilol (COREG) 6 25 mg tablet Oral   • DULoxetine (CYMBALTA) 30 mg delayed release capsule TAKE 1 CAPSULE BY MOUTH EVERY DAY   • Entresto 24-26 MG TABS 1 tablet, Oral, Daily   • gabapentin (NEURONTIN) 100 mg capsule    • Multiple Vitamin (MULTIVITAMINS PO) Oral   • NITROGLYCERIN PO No dose, route, or frequency recorded  • omeprazole (PRILOSEC OTC) 20 mg, Oral   • warfarin (COUMADIN) 5 mg tablet No dose, route, or frequency recorded  Allergies   Allergen Reactions   • Penicillins Rash      Social History     Tobacco Use   • Smoking status: Former   • Smokeless tobacco: Never   Vaping Use   • Vaping Use: Never used   Substance Use Topics   • Alcohol use: Not Currently   • Drug use: Never    Family History   Problem Relation Age of Onset   • Heart disease Father    • COPD Mother           Objective                            Vitals I/O      Most Recent Min/Max in 24hrs   Temp 98 6 °F (37 °C) Temp  Min: 97 4 °F (36 3 °C)  Max: 98 6 °F (37 °C)   Pulse 66 Pulse  Min: 61  Max: 98   Resp 18 Resp  Min: 16  Max: 36   /59 BP  Min: 109/59  Max: 143/95   O2 Sat 95 % SpO2  Min: 92 %  Max: 100 %      Intake/Output Summary (Last 24 hours) at 3/30/2023 0009  Last data filed at 3/29/2023 2130  Gross per 24 hour   Intake 160 ml   Output --   Net 160 ml         Diet NPO     Invasive Monitoring Physical exam      Physical Exam  Vitals and nursing note reviewed  Constitutional:       Appearance: He is ill-appearing  HENT:      Head: Normocephalic  Right Ear: External ear normal       Left Ear: External ear normal       Nose: Nose normal       Mouth/Throat:      Mouth: Mucous membranes are moist    Eyes:      Pupils: Pupils are equal, round, and reactive to light        Comments: Left periorbital edema and bruising    Cardiovascular:      Rate and Rhythm: Normal rate and regular rhythm  Pulses: Normal pulses  Pulmonary:      Breath sounds: Normal breath sounds  Abdominal:      Palpations: Abdomen is soft  Musculoskeletal:      Cervical back: Normal range of motion  Right lower leg: No edema  Left lower leg: No edema  Comments: Strength 5 out of 5 all extremities   Skin:     General: Skin is warm and dry  Capillary Refill: Capillary refill takes less than 2 seconds  Findings: Bruising present  Neurological:      Mental Status: He is alert and oriented to person, place, and time  Sensory: No sensory deficit  Motor: No weakness  Comments: Baseline tremor    Psychiatric:         Mood and Affect: Mood normal          Behavior: Behavior normal             Diagnostic Studies      EKG:   Imaging:  I have personally reviewed pertinent reports         Medications:  Scheduled PRN   atorvastatin, 40 mg, Daily  carbidopa-levodopa, 1 tablet, TID  chlorhexidine, 15 mL, Q12H CHEIKH  DULoxetine, 30 mg, Daily  levETIRAcetam, 500 mg, Q12H CHEIKH  pantoprazole, 20 mg, Early Morning  senna-docusate sodium, 2 tablet, BID      acetaminophen, 650 mg, Q6H PRN  ondansetron, 4 mg, Q4H PRN       Continuous          Labs:    CBC    Recent Labs     03/29/23  1245   WBC 8 63   HGB 16 3   HCT 49 3        BMP    Recent Labs     03/29/23  1245   SODIUM 139   K 4 2      CO2 31   AGAP 4   BUN 13   CREATININE 0 89   CALCIUM 9 2       Coags    Recent Labs     03/29/23  1245 03/29/23  1928   INR 3 37* 1 20*   PTT 44*  --         Additional Electrolytes  No recent results       Blood Gas    No recent results  No recent results LFTs  No recent results    Infectious  No recent results  Glucose  Recent Labs     03/29/23  1245   GLUC 97              Critical Care Time Delivered: Upon my evaluation, this patient had a high probability of imminent or life-threatening deterioration due to TBI, which required my direct attention, intervention, and personal management  I have personally provided 25 minutes of critical care time, exclusive of procedures, teaching, family meetings, and any prior time recorded by providers other than myself       ANITHA You

## 2023-03-29 NOTE — EMTALA/ACUTE CARE TRANSFER
[address] [place] [address]  [facility] [address]  Dept: [phone]      EMTALA TRANSFER CONSENT    NAME Yoselin Soriano                                         1933                              MRN [id number]    I have been informed of my rights regarding examination, treatment, and transfer   by Dr Emily Reynaga MD    Benefits: Specialized equipment and/or services available at the receiving facility (Include comment)________________________    Risks: Potential for delay in receiving treatment, Potential deterioration of medical condition, Loss of IV, Possible worsening of condition or death during transfer, Increased discomfort during transfer          I authorize the performance of emergency medical procedures and treatments upon me in both transit and upon arrival at the receiving facility  Additionally, I authorize the release of any and all medical records to the receiving facility and request they be transported with me, if possible  I understand that the safest mode of transportation during a medical emergency is an ambulance and that the Hospital advocates the use of this mode of transport  Risks of traveling to the receiving facility by car, including absence of medical control, life sustaining equipment, such as oxygen, and medical personnel has been explained to me and I fully understand them  (TYRONE CORRECT BOX BELOW)  [  ]  I consent to the stated transfer and to be transported by ambulance/helicopter  [  ]  I consent to the stated transfer, but refuse transportation by ambulance and accept full responsibility for my transportation by car    I understand the risks of non-ambulance transfers and I exonerate the Hospital and its staff from any deterioration in my condition that results from this refusal     X___________________________________________    DATE  23  TIME________  Signature of patient or legally responsible individual signing on patient behalf           RELATIONSHIP TO PATIENT_________________________          Provider Certification    NAME Sylvester Tucker                                         1933                              MRN [id number]    A medical screening exam was performed on the above named patient  Based on the examination:    Condition Necessitating Transfer The primary encounter diagnosis was Contusion of face, initial encounter  Diagnoses of Subdural hematoma and Subarachnoid hemorrhage ([facility] [address] ) were also pertinent to this visit  Patient Condition: The patient has been stabilized such that within reasonable medical probability, no material deterioration of the patient condition or the condition of the unborn child(liv) is likely to result from the transfer    Reason for Transfer: Level of Care needed not available at this facility    Transfer Requirements: Facility SL   · Space available and qualified personnel available for treatment as acknowledged by Rigoberto Martinez at [facility]  · Agreed to accept transfer and to provide appropriate medical treatment as acknowledged by       Samaria Hanna  · Appropriate medical records of the examination and treatment of the patient are provided at the time of transfer   [address] [place] [address] [address] _______  · Transfer will be performed by qualified personnel from    and appropriate transfer equipment as required, including the use of necessary and appropriate life support measures      Provider Certification: I have examined the patient and explained the following risks and benefits of being transferred/refusing transfer to the patient/family:  General risk, such as traffic hazards, adverse weather conditions, rough terrain or turbulence, possible failure of equipment (including vehicle or aircraft), or consequences of actions of persons outside the control of the transport personnel      Based on these reasonable risks and benefits to the patient and/or the unborn child(liv), and based upon the information available at the time of the patient’s examination, I certify that the medical benefits reasonably to be expected from the provision of appropriate medical treatments at another medical facility outweigh the increasing risks, if any, to the individual’s medical condition, and in the case of labor to the unborn child, from effecting the transfer      X____________________________________________ DATE 03/29/23        TIME_______      ORIGINAL - SEND TO MEDICAL RECORDS   COPY - SEND WITH PATIENT DURING TRANSFER

## 2023-03-29 NOTE — ED PROVIDER NOTES
Emergency Department Trauma Note  Willam Hobson 80 y o  male MRN: [id number]  Unit/Bed#: ED 08/ED 08 Encounter: [id number]      Trauma Alert: Trauma Acuity: Trauma Evaluation  Model of Arrival: Mode of Arrival: Direct from scene via    Trauma Team: Current Providers  Attending Provider: Loan Smith MD  Registered Nurse: Ayo Fuentes RN  Consultants: None      History of Present Illness     Chief Complaint:   Chief Complaint   Patient presents with   • Fall     Wife states that while she was out on Monday he had an unwittnessed fall  Patient believes he struck his face on a corner  Bruising noted on left forehead, under left eye  Small wound above left eyebrow  HPI:  Willam Hobson is a 80 y o  male who presents with fall  Mechanism:Details of Incident: Wife states that while she was out on Monday he had an unwittnessed fall  Patient believes he struck his face on a corner  Bruising noted on left forehead, under left eye  Small wound above left eyebrow  Injury Date: 03/27/23 Injury Time: 1200 Injury Occurence Location - [address] [place] [address] Way: [facility]    Patient is on Coumadin  States on Monday he was bending down and he went to lift his head and hit his head on the corner of a mirror  No loss conscious  No nausea or vomiting  Complains of pain in the left elbow and radius and ulnar and left face  No change in mental status  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  Wife states that he came in as a fall  Found him bruised on Monday when she came back        History provided by:  Patient   used: No    Fall  Mechanism of injury: fall    Injury location:  Head/neck and face  Facial injury location:  Face  Incident location:  Home  Time since incident:  2 days  Fall:     Fall occurred:  Standing    Point of impact:  Head and face    Entrapped after fall: no    Protective equipment: none    Suspicion of alcohol use: no    Suspicion of drug use: no Prior to arrival data:     Bystander interventions:  None    Patient ambulatory at scene: yes      Blood loss:  None    Responsiveness at scene:  Alert    Orientation at scene:  Person, place, situation and time    Airway interventions:  None    Breathing interventions:  None    IV access status:  None    IO access:  None    Fluids administered:  None    Cardiac interventions:  None    Medications administered:  None    Immobilization:  None    Airway condition since incident:  Stable    Breathing condition since incident:  Stable    Circulation condition since incident:  Stable    Mental status condition since incident:  Stable    Disability condition since incident:  Stable  Associated symptoms: neck pain    Associated symptoms: no abdominal pain, no back pain, no chest pain, no headaches, no hearing loss, no loss of consciousness, no nausea, no seizures and no vomiting    Risk factors: anticoagulation therapy      Review of Systems   Constitutional: Negative for chills and fever  HENT: Negative for ear pain, hearing loss, sore throat, trouble swallowing and voice change  Eyes: Negative for pain and discharge  Respiratory: Negative for cough, shortness of breath and wheezing  Cardiovascular: Negative for chest pain and palpitations  Gastrointestinal: Negative for abdominal pain, blood in stool, constipation, diarrhea, nausea and vomiting  Genitourinary: Negative for dysuria, flank pain, frequency and hematuria  Musculoskeletal: Positive for arthralgias, joint swelling, myalgias and neck pain  Negative for back pain and neck stiffness  Skin: Negative for rash and wound  Neurological: Negative for dizziness, seizures, loss of consciousness, syncope, facial asymmetry and headaches  Psychiatric/Behavioral: Negative for hallucinations, self-injury and suicidal ideas  All other systems reviewed and are negative        Historical Information     Immunizations:   Immunization History Administered Date(s) Administered   • COVID-19 MODERNA VACC 0 5 ML IM 01/19/2021, 02/15/2021   • Influenza Quadrivalent Preservative Free 3 years and older IM 11/15/2015, 09/26/2017, 09/16/2019, 09/16/2020   • Influenza Split High Dose Preservative Free IM 09/13/2016, 09/22/2018   • Influenza, seasonal, injectable 09/07/2012, 12/01/2013, 10/28/2014   • Pneumococcal Conjugate 13-Valent 04/29/2015   • Pneumococcal Polysaccharide PPV23 08/06/2012   • Tdap 09/07/2012   • Zoster 04/19/2013       Past Medical History:   Diagnosis Date   • Parkinson disease ([facility] [address] )        Family History   Problem Relation Age of Onset   • Heart disease Father    • COPD Mother      Past Surgical History:   Procedure Laterality Date   • VASECTOMY       Social History     Tobacco Use   • Smoking status: Former   • Smokeless tobacco: Never   Vaping Use   • Vaping Use: Never used   Substance Use Topics   • Alcohol use: Not Currently   • Drug use: Never     E-Cigarette/Vaping   • E-Cigarette Use Never User      E-Cigarette/Vaping Substances       Family History: Noncontributory  Meds/Allergies   Prior to Admission Medications   Prescriptions Last Dose Informant Patient Reported? Taking?    Aspirin Buf,CaCarb-MgCarb-MgO, 81 MG TABS   Yes No   Sig: Take 81 mg by mouth   Cholecalciferol 125 MCG (5000 UT) TABS   Yes No   Sig: Take by mouth   DULoxetine (CYMBALTA) 30 mg delayed release capsule   Yes No   Sig: TAKE 1 CAPSULE BY MOUTH EVERY DAY   Entresto 24-26 MG TABS   Yes No   Sig: Take 1 tablet by mouth daily   Multiple Vitamin (MULTIVITAMINS PO)   Yes No   Sig: Take by mouth   NITROGLYCERIN PO   Yes No   atorvastatin (LIPITOR) 40 mg tablet   Yes No   Sig: TAKE 1 TABLET BY MOUTH EVERY DAY   carbidopa-levodopa (SINEMET)  mg per tablet   Yes No   Sig: TAKE 1 TABLET BY MOUTH THREE TIMES A DAY   Patient not taking: Reported on 7/28/2021   carvedilol (COREG) 6 25 mg tablet   Yes No   Sig: Take by mouth   gabapentin (NEURONTIN) 100 mg capsule   Yes No   omeprazole (PriLOSEC OTC) 20 MG tablet   Yes No   Sig: Take 20 mg by mouth   spironolactone (ALDACTONE) 25 mg tablet   Yes No   Sig: Take by mouth   Patient not taking: Reported on 7/28/2021   sucralfate (CARAFATE) 1 g tablet   Yes No   Patient not taking: Reported on 7/28/2021   warfarin (Coumadin) 5 mg tablet   Yes No      Facility-Administered Medications: None       Allergies   Allergen Reactions   • Penicillins Rash       PHYSICAL EXAM    PE limited by: Nothing    Objective   Vitals:   First set: Temperature: (!) 97 4 °F (36 3 °C) (03/29/23 1230)  Pulse: 72 (03/29/23 1230)  Respirations: 16 (03/29/23 1230)  Blood Pressure: 127/66 (03/29/23 1230)  SpO2: 98 % (03/29/23 1230)    Primary Survey:   (A) Airway: Intact  (B) Breathing: Spontaneous  (C) Circulation: Pulses: Radial pulse 2+ bilaterally  (D) Disabliity: GCS 15  (E) Expose: Done    Secondary Survey: (Click on Physical Exam tab above)  Physical Exam  Vitals and nursing note reviewed  Constitutional:       General: He is not in acute distress  Appearance: He is well-developed  HENT:      Head: Normocephalic  Comments: Bruising noted to the left forehead and left periorbital region  Right Ear: External ear normal       Left Ear: External ear normal       Ears:      Comments: Perforated left TM which is old per patient and wife  Eyes:      General: No scleral icterus  Right eye: No discharge  Left eye: No discharge  Extraocular Movements: Extraocular movements intact  Conjunctiva/sclera: Conjunctivae normal    Cardiovascular:      Rate and Rhythm: Normal rate and regular rhythm  Heart sounds: Normal heart sounds  No murmur heard  Pulmonary:      Effort: Pulmonary effort is normal       Breath sounds: Normal breath sounds  No wheezing or rales  Abdominal:      General: Bowel sounds are normal  There is no distension  Palpations: Abdomen is soft  Tenderness: There is no abdominal tenderness   There is no guarding or rebound  Musculoskeletal:         General: Swelling present  No deformity  Normal range of motion  Cervical back: Normal range of motion and neck supple  Comments: Patient is full range of motion at the left elbow  There is diffuse swelling and tenderness  Small abrasion noted  Radial pulses 2+  Skin:     General: Skin is warm and dry  Findings: No rash  Neurological:      Mental Status: He is alert and oriented to person, place, and time  Cranial Nerves: No cranial nerve deficit  Psychiatric:         Mood and Affect: Mood normal          Behavior: Behavior normal          Thought Content: Thought content normal          Judgment: Judgment normal          Cervical spine cleared by clinical criteria?   No    Invasive Devices     Peripheral Intravenous Line  Duration           Peripheral IV 03/29/23 Right Wrist <1 day                Lab Results:   Results Reviewed     Procedure Component Value Units Date/Time    Basic metabolic panel [[id number]] Collected: 03/29/23 1245    Lab Status: Final result Specimen: Blood Updated: 03/29/23 1306     Sodium 139 mmol/L      Potassium 4 2 mmol/L      Chloride 104 mmol/L      CO2 31 mmol/L      ANION GAP 4 mmol/L      BUN 13 mg/dL      Creatinine 0 89 mg/dL      Glucose 97 mg/dL      Calcium 9 2 mg/dL      eGFR 75 ml/min/1 73sq m     Narrative:      [employer] guidelines for Chronic Kidney Disease (CKD):   •  Stage 1 with normal or high GFR (GFR > 90 mL/min/1 73 square meters)  •  Stage 2 Mild CKD (GFR = 60-89 mL/min/1 73 square meters)  •  Stage 3A Moderate CKD (GFR = 45-59 mL/min/1 73 square meters)  •  Stage 3B Moderate CKD (GFR = 30-44 mL/min/1 73 square meters)  •  Stage 4 Severe CKD (GFR = 15-29 mL/min/1 73 square meters)  •  Stage 5 End Stage CKD (GFR <15 mL/min/1 73 square meters)  Note: GFR calculation is accurate only with a steady state creatinine    APTT [[id number]]  (Abnormal) Collected: 03/29/23 1245    Lab Status: Final result Specimen: Blood Updated: 03/29/23 1301     PTT 44 seconds     Protime-INR [[id number]]  (Abnormal) Collected: 03/29/23 1245    Lab Status: Final result Specimen: Blood Updated: 03/29/23 1301     Protime 34 1 seconds      INR 3 37    CBC and differential [[id number]]  (Abnormal) Collected: 03/29/23 1245    Lab Status: Final result Specimen: Blood Updated: 03/29/23 1252     WBC 8 63 Thousand/uL      RBC 5 32 Million/uL      Hemoglobin 16 3 g/dL      Hematocrit 49 3 %      MCV 93 fL      MCH 30 6 pg      MCHC 33 1 g/dL      RDW 13 6 %      MPV 8 7 fL      Platelets 704 Thousands/uL      nRBC 0 /100 WBCs      Neutrophils Relative 76 %      Immat GRANS % 0 %      Lymphocytes Relative 14 %      Monocytes Relative 7 %      Eosinophils Relative 3 %      Basophils Relative 0 %      Neutrophils Absolute 6 60 Thousands/µL      Immature Grans Absolute 0 02 Thousand/uL      Lymphocytes Absolute 1 18 Thousands/µL      Monocytes Absolute 0 58 Thousand/µL      Eosinophils Absolute 0 22 Thousand/µL      Basophils Absolute 0 03 Thousands/µL                  Imaging Studies:   Direct to CT: Yes  XR elbow 2 views LEFT   ED Interpretation by Ira Maxwell MD (03/29 1258)   No fracture      Final Result by Efrain Lopes MD (03/29 1323)   No acute osseous abnormality  Workstation performed: [id number]         XR forearm 2 views LEFT   ED Interpretation by Ira Maxwell MD (03/29 1258)   No fracture      Final Result by Efrain Lopes MD (03/29 1325)   No acute osseous abnormality  Workstation performed: [id number]         TRAUMA - CT head wo contrast   Final Result by Efrain Lopes MD (03/29 1329)      Moderate right and small left subdural hematomas, small right greater than left parenchymal hematomas and mild right subarachnoid hemorrhage  Other chronic and nonemergent findings above            Workstation performed: [id number]         TRAUMA - CT spine cervical wo contrast Final Result by Kurt Sandoval MD (03/29 1329)      No cervical spine fracture or traumatic malalignment  Workstation performed: [id number]         TRAUMA - CT facial bones wo contrast   Final Result by Kurt Sandoval MD (03/29 1329)      No facial bone fracture identified  Workstation performed: [id number]               Procedures  CriticalCare Time    Date/Time: 3/29/2023 1:45 PM  Performed by: Kavon Butler MD  Authorized by: Kavon Butler MD     Critical care provider statement:     Critical care time (minutes):  35    Critical care was necessary to treat or prevent imminent or life-threatening deterioration of the following conditions:  Trauma    Critical care was time spent personally by me on the following activities:  Discussions with consultants, evaluation of patient's response to treatment, examination of patient, review of old charts, re-evaluation of patient's condition, ordering and review of radiographic studies and ordering and review of laboratory studies  ECG 12 Lead Documentation Only    Date/Time: 3/29/2023 1:46 PM  Performed by: Kavon Butler MD  Authorized by: Kavon Butler MD     ECG reviewed by me, the ED Provider: yes    Patient location:  ED  Interpretation:     Interpretation: non-specific    Rate:     ECG rate:  60  Rhythm:     Rhythm: sinus rhythm    Ectopy:     Ectopy: none    QRS:     QRS axis:  Normal  T waves:     T waves: inverted      Inverted:  V4, V6 and V5             ED Course  ED Course as of 03/29/23 1357   Wed Mar 29, 2023   1232 Portable chest x-ray not indicated at this time  Patient has no respiratory complaints  No shortness of breath  Lungs with equal bilateral breath sounds  Chest is nontender to palpation  1232 Portable pelvis x-ray not indicated at this time  Patient has no pain in the pelvis  Walking without any pain  1333 Discussed with patient and wife  Prefer to go to [facility]     1339 Accepted at [facility] by Dr Myers Starch  1345 Cervical Collar Clearance: The patient had a CT scan of the cervical spine demonstrating no acute injury  On exam, the patient had no midline point tenderness or paresthesias/numbness/weakness in the extremities  The patient had full range of motion (was then able to flex, extend, and rotate head laterally) without pain  There were no distracting injuries and the patient was not intoxicated  The patient's cervical spine was cleared radiologically and clinically  Cervical collar removed at this time  Liang Landaverde MD  3/29/2023 1:45 PM               Medical Decision Making  Amount and/or Complexity of Data Reviewed  Independent Historian: spouse  Labs: ordered  Decision-making details documented in ED Course  Radiology: ordered and independent interpretation performed  Decision-making details documented in ED Course  Risk  OTC drugs  Prescription drug management  Decision regarding hospitalization  Disposition  [facility] Transfer: No  Final diagnoses:   Contusion of face, initial encounter   Subdural hematoma   Subarachnoid hemorrhage ([facility] [address] )     Time reflects when diagnosis was documented in both MDM as applicable and the Disposition within this note     Time User Action Codes Description Comment    3/29/2023  1:15 PM Laron Fulton Snnito Add [S09 90XA] Closed head injury, initial encounter     3/29/2023  1:15 PM Tahira Fultonaldo Snooks Add [S00 83XA] Contusion of face, initial encounter     3/29/2023  1:33 PM Laron Fulton Snooks Modify [S00 83XA] Contusion of face, initial encounter     3/29/2023  1:33 PM Tahira Fultonaldo Snnito Remove [S09 90XA] Closed head injury, initial encounter     3/29/2023  1:33 PM Christian Wiley Add [S06  5XAA] Subdural hematoma     3/29/2023  1:33 PM Tahira Fultonaldo Snooks Add [I60 9] Subarachnoid hemorrhage [facility])       ED Disposition     ED Disposition   Transfer to Another Facility-In Network    Condition   --    Date/Time   Wed Mar 29, 2023 1:33 PM    Comment   Yoselin Soriano should be transferred out to [facility]  MD Documentation    [address] Most Recent Value   Patient Condition The patient has been stabilized such that within reasonable medical probability, no material deterioration of the patient condition or the condition of the unborn child(liv) is likely to result from the transfer   Reason for Transfer Level of Care needed not available at this facility   Benefits of Transfer Specialized equipment and/or services available at the receiving facility (Include comment)________________________   Risks of Transfer Potential for delay in receiving treatment, Potential deterioration of medical condition, Loss of IV, Possible worsening of condition or death during transfer, Increased discomfort during transfer   Accepting Physician Ian Hoffman Name, [address]    (Name & Tel number) Mahesh Garay at [facility]   Sending MD Estelle Haq   Provider Certification General risk, such as traffic hazards, adverse weather conditions, rough terrain or turbulence, possible failure of equipment (including vehicle or aircraft), or consequences of actions of persons outside the control of the transport personnel      RN Documentation    Flowsheet Row Most [address] [facility] Name, [address]  [facility]    (Name & Tel number) Mahesh Garay at [address]    None       Patient's Medications   Discharge Prescriptions    No medications on file     No discharge procedures on file      PDMP Review     None          ED Provider  Electronically Signed by         Emily Reynaga MD  03/29/23 8119 delmi Brar MD  03/29/23 1008

## 2023-03-29 NOTE — H&P
[address]  H&P  Name: Kaur Hays  MRN: [id number]  Unit/Bed#: ICU 03 I Date of Admission: 3/29/2023   Date of Service: 3/30/2023 I Hospital Day: 1      Assessment/Plan   SAH (subarachnoid hemorrhage) ([address] )  Assessment & Plan  - traumatic  - repeat CTH in AM    SDH (subdural hematoma)  Assessment & Plan  - secondary to fall at home  - bilateral  - coumadin reversed prior to transfer  - neurosurgery consult  - normal neuro exam    Fall from standing  Assessment & Plan  - unwitnessed  - PT/OT consult         Trauma Alert: Other transfer   Model of Arrival: Ambulance    Trauma Team: Attending Dr Cristy Prasad  Consultants:     Neurosurgery: SDH  [address] - STAT consult; notified at [address] [employer] [place] via text; History of Present Illness     Chief Complaint: head pain  Mechanism:Fall     HPI:    Frances Hinton is a 80 y o  male who presents after a fall at home  Patient states on Monday he was bending down and he went to lift his head and hit his head on the corner of a mirror  No loss of conscious  No nausea or vomiting  Complains of pain in the left elbow and radius and ulnar and left face  No change in mental status  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  Wife states that he came in as an unwitnessed fall  Found him bruised on Monday when she came back  Review of Systems   Constitutional: Negative for fatigue and fever  HENT: Negative for sinus pain and trouble swallowing  Eyes: Negative for pain and visual disturbance  Respiratory: Negative for chest tightness and shortness of breath  Cardiovascular: Negative for chest pain and palpitations  Gastrointestinal: Negative for abdominal distention and abdominal pain  Genitourinary: Negative for difficulty urinating  Musculoskeletal: Negative for back pain and neck pain  Neurological: Negative for dizziness and light-headedness  Hematological: Bruises/bleeds easily       12-point, complete review of systems was reviewed and negative except as stated above  Historical Information     Past Medical History:   Diagnosis Date   • Parkinson disease ([facility] [address] )      Past Surgical History:   Procedure Laterality Date   • VASECTOMY          Social History     Tobacco Use   • Smoking status: Former   • Smokeless tobacco: Never   Vaping Use   • Vaping Use: Never used   Substance Use Topics   • Alcohol use: Not Currently   • Drug use: Never     Immunization History   Administered Date(s) Administered   • COVID-19 MODERNA VACC 0 5 ML IM 01/19/2021, 02/15/2021   • Influenza Quadrivalent Preservative Free 3 years and older IM 11/15/2015, 09/26/2017, 09/16/2019, 09/16/2020   • Influenza Split High Dose Preservative Free IM 09/13/2016, 09/22/2018   • Influenza, seasonal, injectable 09/07/2012, 12/01/2013, 10/28/2014   • Pneumococcal Conjugate 13-Valent 04/29/2015   • Pneumococcal Polysaccharide PPV23 08/06/2012   • Tdap 09/07/2012   • Zoster 04/19/2013     Last Tetanus: N/A  Family History: Non-contributory    1  Before the illness or injury that brought you to the Emergency, did you need someone to help you on a regular basis? 1=Yes   2  Since the illness or injury that brought you to the Emergency, have you needed more help than usual to take care of yourself? 0=No   3  Have you been hospitalized for one or more nights during the past 6 months (excluding a stay in the Emergency Department)? 0=No   4  In general, do you see well? 0=Yes   5  In general, do you have serious problems with your memory? 0=No   6  Do you take more than three different medications everyday?  1=Yes   TOTAL   2     Did you order a geriatric consult if the score was 2 or greater?: yes     Meds/Allergies   all current active meds have been reviewed     Allergies   Allergen Reactions   • Penicillins Rash       Objective   Initial Vitals:   Temperature: 98 °F (36 7 °C) (03/29/23 1703)  Pulse: 67 (03/29/23 1703)  Respirations: 20 (03/29/23 1703)  Blood Pressure: 123/66 (03/29/23 1703)    Primary Survey:   Airway:        Status: patent; Hospital Interventions: none  Breathing:        Pre-hospital Interventions: none       Effort: normal       Right breath sounds: normal       Left breath sounds: normal  Circulation:        Rhythm:        Rate: regular   Right Pulses Left Pulses    R radial: 2+    R pedal: 2+     L radial: 2+    L pedal: 2+       Disability:        GCS: Eye: 4; Verbal: 5 Motor: 6 Total: 15       Right Pupil: 3 mm;  round;  reactive         Left Pupil:  3 mm;  round;  reactive      R Motor Strength L Motor Strength    R : 5/5  R dorsiflex: 5/5  R plantarflex: 5/5 L : 4/5  L dorsiflex: 4/5  L plantarflex: 4/5        Sensory:  No sensory deficit  Exposure:       Completed: Yes      Secondary Survey:  Physical Exam  Vitals reviewed  Constitutional:       General: He is not in acute distress  Appearance: He is normal weight  He is not ill-appearing, toxic-appearing or diaphoretic  HENT:      Head: Normocephalic  Comments: L periorbital edema     Right Ear: External ear normal       Left Ear: External ear normal       Nose: Nose normal       Mouth/Throat:      Mouth: Mucous membranes are moist       Pharynx: Oropharynx is clear  Eyes:      General:         Right eye: No discharge  Left eye: No discharge  Extraocular Movements: Extraocular movements intact  Conjunctiva/sclera: Conjunctivae normal       Pupils: Pupils are equal, round, and reactive to light  Cardiovascular:      Rate and Rhythm: Normal rate  Pulses: Normal pulses  Pulmonary:      Effort: Pulmonary effort is normal  No respiratory distress  Breath sounds: No wheezing  Abdominal:      General: Abdomen is flat  There is no distension  Palpations: Abdomen is soft  Tenderness: There is no abdominal tenderness  There is no guarding  Musculoskeletal:         General: Normal range of motion     Skin: General: Skin is warm and dry  Capillary Refill: Capillary refill takes less than 2 seconds  Neurological:      Mental Status: He is alert and oriented to person, place, and time  Cranial Nerves: No cranial nerve deficit  Sensory: No sensory deficit  Motor: Weakness present  Comments: 4/5 strength LUE and LLE, at baseline   Psychiatric:         Mood and Affect: Mood normal          Behavior: Behavior normal          Invasive Devices     Peripheral Intravenous Line  Duration           Peripheral IV 03/29/23 Right Antecubital <1 day    Peripheral IV 03/29/23 Right Wrist <1 day              Lab Results: Results: I have personally reviewed all pertinent laboratory/tests results    Imaging Results: I have personally reviewed pertinent films in PACS  Chest Xray(s): negative for acute findings   FAST exam(s): N/A   CT Scan(s): positive for acute findings: see reports   Additional Xray(s): N/A     Other Studies:     Disposition: admit to critical care    Code Status: Level 3 - DNAR and DNI  Advance Directive and Living Will:      Power of :    POLST:    I have spent 30 minutes with Patient  today in which greater than 50% of this time was spent in counseling/coordination of care regarding Prognosis, Risks and benefits of tx options, Counseling / Coordination of care, Documenting in the medical record, Reviewing / ordering tests, medicine, procedures  , Obtaining or reviewing history   and Communicating with other healthcare professionals

## 2023-03-29 NOTE — TELEMEDICINE
Neurosurgery e-Consult (IPC)     Mavis House 80 y o  male MRN: [id number]  Unit/Bed#: ED 24 Encounter: [id number]    Contacted by ED provider Edis Prasad  Reason for consult: [address] findings    Per provider report, patient has h/o Parkinson's disease, remote CVA, and Afib on Coumadin (INR in ED 3 37) reversed with [facility] who presents after an unwitnessed fall on Monday 3/27  Available past medical history, social history, surgical history, medication list, drug allergies and review of systems were reviewed  /61   Pulse 64   Temp 98 °F (36 7 °C) (Oral)   Resp 20   SpO2 92%      Also per report, exam is neurologically intact without deficits  I personally reviewed all relevant imaging:    CTH demonstrates bilateral SDH, larger (~1 cm maximal thickness) and more acute in right convexity a/w SAH near regions of encephalomalacia (likely due to remote CVA), no significant MLS  No skull fracture  CT c-spine and face negative  Assessment and Recommendations    1  SBP < 160  2  Repeat CTH pending  3  INR goal < 1 4 s/p reversal, hold all forms of AC/AP including Coumadin  4  Monitor neurologic exam closely, obtain STAT CTH with any acute change in symptoms or exam     All questions and concerns were addressed  Provider is in agreement with the course of action  31+ minutes, >50% of the total time devoted to medical consultative verbal/EMR discussion between providers  Written report will be generated in the EMR  Katrin LANDEROS    Neurosurgeon On Call

## 2023-03-30 ENCOUNTER — APPOINTMENT (INPATIENT)
Dept: RADIOLOGY | Facility: HOSPITAL | Age: 88
End: 2023-03-30

## 2023-03-30 ENCOUNTER — TELEPHONE (OUTPATIENT)
Dept: NEUROSURGERY | Facility: CLINIC | Age: 88
End: 2023-03-30

## 2023-03-30 LAB
ALBUMIN SERPL BCP-MCNC: 3.1 G/DL (ref 3.5–5)
ALP SERPL-CCNC: 53 U/L (ref 46–116)
ALT SERPL W P-5'-P-CCNC: 17 U/L (ref 12–78)
ANION GAP SERPL CALCULATED.3IONS-SCNC: 3 MMOL/L (ref 4–13)
AST SERPL W P-5'-P-CCNC: 25 U/L (ref 5–45)
ATRIAL RATE: 62 BPM
BASOPHILS # BLD AUTO: 0.02 THOUSANDS/ÂΜL (ref 0–0.1)
BASOPHILS NFR BLD AUTO: 0 % (ref 0–1)
BILIRUB SERPL-MCNC: 1.27 MG/DL (ref 0.2–1)
BUN SERPL-MCNC: 14 MG/DL (ref 5–25)
CA-I BLD-SCNC: 1.17 MMOL/L (ref 1.12–1.32)
CALCIUM ALBUM COR SERPL-MCNC: 9.6 MG/DL (ref 8.3–10.1)
CALCIUM SERPL-MCNC: 8.9 MG/DL (ref 8.3–10.1)
CHLORIDE SERPL-SCNC: 104 MMOL/L (ref 96–108)
CO2 SERPL-SCNC: 30 MMOL/L (ref 21–32)
CREAT SERPL-MCNC: 0.86 MG/DL (ref 0.6–1.3)
EOSINOPHIL # BLD AUTO: 0.05 THOUSAND/ÂΜL (ref 0–0.61)
EOSINOPHIL NFR BLD AUTO: 1 % (ref 0–6)
ERYTHROCYTE [DISTWIDTH] IN BLOOD BY AUTOMATED COUNT: 13.5 % (ref 11.6–15.1)
GFR SERPL CREATININE-BSD FRML MDRD: 76 ML/MIN/1.73SQ M
GLUCOSE SERPL-MCNC: 100 MG/DL (ref 65–140)
HCT VFR BLD AUTO: 43.4 % (ref 36.5–49.3)
HGB BLD-MCNC: 14.7 G/DL (ref 12–17)
IMM GRANULOCYTES # BLD AUTO: 0.03 THOUSAND/UL (ref 0–0.2)
IMM GRANULOCYTES NFR BLD AUTO: 0 % (ref 0–2)
INR PPP: 1.2 (ref 0.84–1.19)
LYMPHOCYTES # BLD AUTO: 0.98 THOUSANDS/ÂΜL (ref 0.6–4.47)
LYMPHOCYTES NFR BLD AUTO: 13 % (ref 14–44)
MAGNESIUM SERPL-MCNC: 2 MG/DL (ref 1.6–2.6)
MCH RBC QN AUTO: 31.3 PG (ref 26.8–34.3)
MCHC RBC AUTO-ENTMCNC: 33.9 G/DL (ref 31.4–37.4)
MCV RBC AUTO: 93 FL (ref 82–98)
MONOCYTES # BLD AUTO: 0.55 THOUSAND/ÂΜL (ref 0.17–1.22)
MONOCYTES NFR BLD AUTO: 7 % (ref 4–12)
NEUTROPHILS # BLD AUTO: 6.2 THOUSANDS/ÂΜL (ref 1.85–7.62)
NEUTS SEG NFR BLD AUTO: 79 % (ref 43–75)
NRBC BLD AUTO-RTO: 0 /100 WBCS
PLATELET # BLD AUTO: 137 THOUSANDS/UL (ref 149–390)
PMV BLD AUTO: 8.8 FL (ref 8.9–12.7)
POTASSIUM SERPL-SCNC: 3.9 MMOL/L (ref 3.5–5.3)
PR INTERVAL: 216 MS
PROT SERPL-MCNC: 6.2 G/DL (ref 6.4–8.4)
PROTHROMBIN TIME: 15.4 SECONDS (ref 11.6–14.5)
QRS AXIS: -53 DEGREES
QRSD INTERVAL: 80 MS
QT INTERVAL: 386 MS
QTC INTERVAL: 391 MS
RBC # BLD AUTO: 4.69 MILLION/UL (ref 3.88–5.62)
SODIUM SERPL-SCNC: 137 MMOL/L (ref 135–147)
T WAVE AXIS: 100 DEGREES
VENTRICULAR RATE: 62 BPM
WBC # BLD AUTO: 7.83 THOUSAND/UL (ref 4.31–10.16)

## 2023-03-30 RX ORDER — HEPARIN SODIUM 5000 [USP'U]/ML
5000 INJECTION, SOLUTION INTRAVENOUS; SUBCUTANEOUS EVERY 8 HOURS SCHEDULED
Status: DISCONTINUED | OUTPATIENT
Start: 2023-03-30 | End: 2023-04-01

## 2023-03-30 RX ORDER — CARVEDILOL 6.25 MG/1
6.25 TABLET ORAL 2 TIMES DAILY WITH MEALS
Status: DISCONTINUED | OUTPATIENT
Start: 2023-03-30 | End: 2023-04-03 | Stop reason: HOSPADM

## 2023-03-30 RX ORDER — LIDOCAINE 50 MG/G
1 PATCH TOPICAL DAILY PRN
Status: DISCONTINUED | OUTPATIENT
Start: 2023-03-30 | End: 2023-04-03 | Stop reason: HOSPADM

## 2023-03-30 RX ADMIN — ATORVASTATIN CALCIUM 40 MG: 40 TABLET, FILM COATED ORAL at 09:18

## 2023-03-30 RX ADMIN — CHLORHEXIDINE GLUCONATE 0.12% ORAL RINSE 15 ML: 1.2 LIQUID ORAL at 09:18

## 2023-03-30 RX ADMIN — HEPARIN SODIUM 5000 UNITS: 5000 INJECTION INTRAVENOUS; SUBCUTANEOUS at 13:13

## 2023-03-30 RX ADMIN — CHLORHEXIDINE GLUCONATE 0.12% ORAL RINSE 15 ML: 1.2 LIQUID ORAL at 21:16

## 2023-03-30 RX ADMIN — CARBIDOPA AND LEVODOPA 1 TABLET: 10; 100 TABLET ORAL at 09:17

## 2023-03-30 RX ADMIN — PANTOPRAZOLE SODIUM 20 MG: 20 TABLET, DELAYED RELEASE ORAL at 06:38

## 2023-03-30 RX ADMIN — SENNOSIDES AND DOCUSATE SODIUM 2 TABLET: 8.6; 5 TABLET ORAL at 17:13

## 2023-03-30 RX ADMIN — CARBIDOPA AND LEVODOPA 1 TABLET: 10; 100 TABLET ORAL at 21:16

## 2023-03-30 RX ADMIN — CARBIDOPA AND LEVODOPA 1 TABLET: 10; 100 TABLET ORAL at 17:13

## 2023-03-30 RX ADMIN — LEVETIRACETAM 500 MG: 100 INJECTION, SOLUTION INTRAVENOUS at 22:21

## 2023-03-30 RX ADMIN — DULOXETINE HYDROCHLORIDE 30 MG: 30 CAPSULE, DELAYED RELEASE ORAL at 09:18

## 2023-03-30 RX ADMIN — CARVEDILOL 6.25 MG: 6.25 TABLET, FILM COATED ORAL at 17:13

## 2023-03-30 RX ADMIN — HEPARIN SODIUM 5000 UNITS: 5000 INJECTION INTRAVENOUS; SUBCUTANEOUS at 21:16

## 2023-03-30 RX ADMIN — LEVETIRACETAM 500 MG: 100 INJECTION, SOLUTION INTRAVENOUS at 09:18

## 2023-03-30 NOTE — UTILIZATION REVIEW
"NOTIFICATION OF INPATIENT ADMISSION   AUTHORIZATION REQUEST   SERVICING FACILITY:   [facility]  Address: [address] [place] [address]  Tax ID: [id number]  NPI: [id number] ATTENDING PROVIDER:  Attending Name and NPI#: Jina Win Md [[id number]]  Address: [address] [place] [address]  Phone: [phone]   ADMISSION INFORMATION:  Place of Service: [place] [address]  Place of Service Code: 21  Inpatient Admission Date/Time: 3/29/23  7:24 PM  Discharge Date/Time: No discharge date for patient encounter  Admitting Diagnosis Code/Description:  SAH (subarachnoid hemorrhage) ([facility]) [I60 9]  SDH (subdural hematoma) ([facility] [address] ) [S06  5XAA]  Intraparenchymal hemorrhage of brain ([facility] [address] ) [I61 9]  Contusion of face, initial encounter [S00 83XA]  Unspecified multiple injuries, initial encounter [T07  XXXA]     UTILIZATION REVIEW CONTACT:  Anmol De Dios Utilization   Network Utilization Review Department  Phone: [phone]  Fax: [phone]  Email: [email or web address]  Contact for approvals/pending authorizations, clinical reviews, and discharge  PHYSICIAN ADVISORY SERVICES:  Medical Necessity Denial & [employer] Review  Phone: [phone]  Fax: [phone]  Email: [email or web address]         "

## 2023-03-30 NOTE — UTILIZATION REVIEW
Initial Clinical Review    Admission: Date/Time/Statement:   Admission Orders (From admission, onward)     Ordered        03/29/23 1924  Inpatient Admission  Once                      Orders Placed This Encounter   Procedures   • Inpatient Admission     Standing Status:   Standing     Number of Occurrences:   1     Order Specific Question:   Level of Care     Answer:   Critical Care [15]     Order Specific Question:   Estimated length of stay     Answer:   More than 2 Midnights     Order Specific Question:   Certification     Answer:   I certify that inpatient services are medically necessary for this patient for a duration of greater than two midnights  See H&P and MD Progress Notes for additional information about the patient's course of treatment  ED Arrival Information     Expected   3/29/2023     Arrival   3/29/2023 16:50    Acuity   Urgent            Means of arrival   Ambulance    Escorted by   [address] [facility]    Service   Trauma    Admission type   Emergency            Arrival complaint   trauma           Chief Complaint   Patient presents with   • Trauma     Pt trauma transfer, pt hit right side of head on corner of mirror with subdural hematoma  Pt on coumadin        Initial Presentation: 80 y o  male who presented to [place] [address] ED  Admitted Inpatient status to ICU dt subarachnoid hemorrhage  PMHx: Parkinson disease, CVA  Presented after fall at home, striking head  C/o left elbow, radius, ulnar and left side of face pain  On exam, left orbital edema and weakness noted  CTH  revealed right subarachnoid hemorrhage, BL subdural hemaotma, and left intraparenchymal hematoma  Transferred to [address] [place] [address] for further eval and higher level of care  Plan:  repeat CTH in am, neurosurgery consulted, monitor neuro exam, PT OT consult       3/29 Per Neurosurgery: [facility] demonstrates bilateral SDH, larger (~1 cm maximal thickness) and more acute in right convexity a/w SAH near regions of encephalomalacia (likely due to remote CVA), no significant MLS  No skull fracture  CT c-spine and face negative  Keep SBP <160, repeat CTH, monitor INR s/p reversal and hold AC/AP, monitor neuro exam     Date: 3/30   Day 2:   A&O x3, no new complaints today  Continue neuro checks qh, Keppra, fu on CTH today, NS following, keep NPO, monitor electrolytes and replete prn, continue to hold AC/AP  PT OT eval  Continue ICU level of care      ED Triage Vitals   Temperature Pulse Respirations Blood Pressure SpO2   03/29/23 1703 03/29/23 1703 03/29/23 1703 03/29/23 1703 03/29/23 1703   98 °F (36 7 °C) 67 20 123/66 94 %      Temp Source Heart Rate Source Patient Position - Orthostatic VS BP Location FiO2 (%)   03/29/23 1703 03/29/23 1703 03/29/23 1803 03/29/23 2020 --   Oral Monitor Lying Left arm       Pain Score       03/29/23 1703       4          Wt Readings from Last 1 Encounters:   03/29/23 64 8 kg (142 lb 13 7 oz)     Additional Vital Signs:   Date/Time Temp Pulse Resp BP MAP (mmHg) SpO2 O2 Device Patient Position - Orthostatic VS   03/30/23 0700 -- 72 20 95/65 75 92 % -- --   03/30/23 0600 -- 62 18 118/66 83 95 % -- --   03/30/23 0500 -- 66 20 97/61 82 96 % -- --   03/30/23 0400 98 4 °F (36 9 °C) 72 20 124/74 97 92 % -- --   03/30/23 0300 -- 68 23 Abnormal  116/60 72 94 % -- --   03/30/23 0230 -- 74 20 103/64 87 95 % -- --   03/30/23 0200 -- 74 20 -- -- 93 % -- --   03/30/23 0100 -- 68 22 110/60 75 94 % -- --   03/30/23 0000 98 6 °F (37 °C) 68 20 106/63 78 94 % None (Room air) --   03/29/23 2310 -- 66 18 113/59 86 95 % -- --   03/29/23 2300 -- 62 18 -- -- 95 % -- --   03/29/23 2220 -- 64 18 109/58 84 96 % -- --   03/29/23 2200 -- 72 20 -- -- -- -- --   03/29/23 2110 -- 66 20 -- 99 96 % -- --   03/29/23 2100 -- 66 23 Abnormal  -- -- 96 % -- --   03/29/23 2040 -- 68 18 118/56 83 96 % -- --   03/29/23 2020 98 6 °F (37 °C) 72 22 143/95 127 96 % None (Room air) Lying   03/29/23 1900 -- 64 18 120/67 -- 96 % None (Room air) Lying   03/29/23 1803 -- 64 20 112/61 -- 92 % None (Room air) Lying   03/29/23 1717 -- -- -- -- -- -- None (Room air) --   03/29/23 17:03:52 98 °F (36 7 °C) 67 20 123/66 -- 94 % None (Room air) --     Pertinent Labs/Diagnostic Test Results:  3/29 EKG: SR     CT head wo contrast   Final Result by Estella Ho MD (03/30 7126)      Intracranial hemorrhages, as described above, without significant change compared to yesterday  Please see discussion  The images are available for clinical review  Workstation performed: [id number]               Results from last 7 days   Lab Units 03/30/23  0510 03/29/23  1245   WBC Thousand/uL 7 83 8 63   HEMOGLOBIN g/dL 14 7 16 3   HEMATOCRIT % 43 4 49 3   PLATELETS Thousands/uL 137* 156   NEUTROS ABS Thousands/µL 6 20 6 60         Results from last 7 days   Lab Units 03/30/23  0510 03/29/23  1245   SODIUM mmol/L 137 139   POTASSIUM mmol/L 3 9 4 2   CHLORIDE mmol/L 104 104   CO2 mmol/L 30 31   ANION GAP mmol/L 3* 4   BUN mg/dL 14 13   CREATININE mg/dL 0 86 0 89   EGFR ml/min/1 73sq m 76 75   CALCIUM mg/dL 8 9 9 2   CALCIUM, IONIZED mmol/L 1 17  --    MAGNESIUM mg/dL 2 0  --      Results from last 7 days   Lab Units 03/30/23  0510   AST U/L 25   ALT U/L 17   ALK PHOS U/L 53   TOTAL PROTEIN g/dL 6 2*   ALBUMIN g/dL 3 1*   TOTAL BILIRUBIN mg/dL 1 27*         Results from last 7 days   Lab Units 03/30/23  0510 03/29/23  1245   GLUCOSE RANDOM mg/dL 100 97     Results from last 7 days   Lab Units 03/30/23  0510 03/29/23  1928 03/29/23  1245   PROTIME seconds 15 4* 15 4* 34 1*   INR  1 20* 1 20* 3 37*   PTT seconds  --   --  44*       Past Medical History:   Diagnosis Date   • Parkinson disease ([facility] [address] )      Present on Admission:  **None**      Admitting Diagnosis: SAH (subarachnoid hemorrhage) ([facility]) [I60 9]  SDH (subdural hematoma) ([address] ) [S06  5XAA]  Intraparenchymal hemorrhage of brain ([facility] [address] ) [I61 9]  Contusion of face, initial encounter Derek Casanova, initial encounter [id number]  [id number]]  Fall from standing, initial encounter [W19  XXXA]  Age/Sex: 80 y o  male  Admission Orders:  Scheduled Medications:  atorvastatin, 40 mg, Oral, Daily  carbidopa-levodopa, 1 tablet, Oral, TID  chlorhexidine, 15 mL, Mouth/Throat, Q12H CHEIKH  DULoxetine, 30 mg, Oral, Daily  levETIRAcetam, 500 mg, Intravenous, Q12H CHEIKH  pantoprazole, 20 mg, Oral, Early Morning  senna-docusate sodium, 2 tablet, Oral, BID      Continuous IV Infusions: none     PRN Meds:  acetaminophen, 650 mg, Oral, Q6H PRN  lidocaine, 1 patch, Topical, Daily PRN  ondansetron, 4 mg, Intravenous, Q4H PRN    scd  IO, daily wts    IP CONSULT TO GERONTOLOGY  IP CONSULT TO NEUROSURGERY  IP CONSULT TO CASE MANAGEMENT    Network Utilization Review Department  ATTENTION: Please call with any questions or concerns to [phone] and carefully listen to the prompts so that you are directed to the right person  All voicemails are confidential   Lashonda Pride all requests for admission clinical reviews, approved or denied determinations and any other requests to dedicated fax number below belonging to the campus where the patient is receiving treatment   List of dedicated fax numbers for the Facilities:  [address] DENIALS (Administrative/Medical Necessity) [phone]   [address] (Maternity/NICU/Pediatrics) [phone]   911 Kaylan Hoffman [phone]   [facility] [address] [phone]   [address] [address] Aisha Pond 28 [phone]   [address] [place] [facility] [place] [address] 8[address] [facility] [phone]

## 2023-03-30 NOTE — H&P
[facility]  H&P  Name: Miller Trejo  MRN: [id number]  Unit/Bed#: ICU 03 I Date of Admission: 3/29/2023   Date of Service: 3/29/2023 I Hospital Day: 0      Assessment/Plan   SAH (subarachnoid hemorrhage) ([facility] )  Assessment & Plan  - traumatic  - repeat CTH in AM    SDH (subdural hematoma)  Assessment & Plan  - secondary to fall at home  - bilateral  - coumadin reversed prior to transfer  - neurosurgery consult  - normal neuro exam    Fall from standing  Assessment & Plan  - PT/OT consult         Trauma Alert: Other transfer   Model of Arrival: Ambulance    Trauma Team: Attending Dr Karyn Garcia  Consultants:     Neurosurgery: [facility], [facility] - STAT consult; notified at [address] via text; History of Present Illness     Chief Complaint: head ache  Mechanism:{Mechanism of injury:86913}     HPI:    Abhi Quintero is a 80 y o  male who presents with ***  Review of Systems   Constitutional: Negative for fatigue and fever  HENT: Negative for trouble swallowing and voice change  Eyes: Negative for pain and visual disturbance  Respiratory: Negative for chest tightness and shortness of breath  Cardiovascular: Negative for chest pain and palpitations  Gastrointestinal: Negative for abdominal distention and abdominal pain  Genitourinary: Negative for difficulty urinating  Musculoskeletal: Negative for back pain and neck pain  Skin: Negative for wound  Neurological: Negative for dizziness and weakness  Hematological: Bruises/bleeds easily  12-point, complete review of systems was reviewed and negative except as stated above       Historical Information     Past Medical History:   Diagnosis Date   • Parkinson disease ([facility] )      Past Surgical History:   Procedure Laterality Date   • VASECTOMY          Social History     Tobacco Use   • Smoking status: Former   • Smokeless tobacco: Never   Vaping Use   • Vaping Use: Never used   Substance Use Topics   • Alcohol use: Not Currently   • Drug use: Never     Immunization History   Administered Date(s) Administered   • COVID-19 MODERNA VACC 0 5 ML IM 01/19/2021, 02/15/2021   • Influenza Quadrivalent Preservative Free 3 years and older IM 11/15/2015, 09/26/2017, 09/16/2019, 09/16/2020   • Influenza Split High Dose Preservative Free IM 09/13/2016, 09/22/2018   • Influenza, seasonal, injectable 09/07/2012, 12/01/2013, 10/28/2014   • Pneumococcal Conjugate 13-Valent 04/29/2015   • Pneumococcal Polysaccharide PPV23 08/06/2012   • Tdap 09/07/2012   • Zoster 04/19/2013     Last Tetanus: N/A  Family History: Non-contributory    1  Before the illness or injury that brought you to the Emergency, did you need someone to help you on a regular basis? 1=Yes   2  Since the illness or injury that brought you to the Emergency, have you needed more help than usual to take care of yourself? 0=No   3  Have you been hospitalized for one or more nights during the past 6 months (excluding a stay in the Emergency Department)? 0=No   4  In general, do you see well? 0=Yes   5  In general, do you have serious problems with your memory? 0=No   6  Do you take more than three different medications everyday? 1=Yes   TOTAL   2     Did you order a geriatric consult if the score was 2 or greater?: yes     Meds/Allergies   all current active meds have been reviewed     Allergies   Allergen Reactions   • Penicillins Rash       Objective   Initial Vitals:   Temperature: 98 °F (36 7 °C) (03/29/23 1703)  Pulse: 67 (03/29/23 1703)  Respirations: 20 (03/29/23 1703)  Blood Pressure: 123/66 (03/29/23 1703)    Primary Survey:   Airway:        Status: patent;                 Hospital Interventions: none  Breathing:        Pre-hospital Interventions: none       Effort: normal       Right breath sounds: normal       Left breath sounds: normal  Circulation:        Rhythm:        Rate: regular   Right Pulses Left Pulses    R radial: 2+    R pedal: 2+     L radial: 2+    L pedal: 2+ Disability:        GCS: Eye: 4; Verbal: 5 Motor: 6 Total: 15       Right Pupil: 3 mm;  round;  reactive         Left Pupil:  3 mm;  round;  reactive      R Motor Strength L Motor Strength    R : 5/5  R dorsiflex: 5/5  R plantarflex: 5/5 L : 4/5  L dorsiflex: 4/5  L plantarflex: 4/5        Sensory:  No sensory deficit  Exposure:       Completed: Yes      Secondary Survey:  Physical Exam  Vitals reviewed  Constitutional:       General: He is not in acute distress  Appearance: He is normal weight  He is not ill-appearing, toxic-appearing or diaphoretic  HENT:      Head: Normocephalic  Comments: L periorbital edema     Right Ear: External ear normal       Left Ear: External ear normal       Nose: Nose normal       Mouth/Throat:      Mouth: Mucous membranes are moist       Pharynx: Oropharynx is clear  Eyes:      General:         Right eye: No discharge  Left eye: No discharge  Extraocular Movements: Extraocular movements intact  Conjunctiva/sclera: Conjunctivae normal       Pupils: Pupils are equal, round, and reactive to light  Cardiovascular:      Rate and Rhythm: Normal rate  Pulses: Normal pulses  Pulmonary:      Effort: Pulmonary effort is normal  No respiratory distress  Breath sounds: No wheezing  Abdominal:      General: Abdomen is flat  There is no distension  Palpations: Abdomen is soft  Tenderness: There is no abdominal tenderness  There is no guarding  Musculoskeletal:         General: Normal range of motion  Skin:     General: Skin is warm and dry  Capillary Refill: Capillary refill takes less than 2 seconds  Neurological:      Mental Status: He is alert and oriented to person, place, and time  Cranial Nerves: No cranial nerve deficit  Sensory: No sensory deficit  Motor: Weakness present        Comments: 4/5 strength MODESTAE and GAGE, at baseline   Psychiatric:         Mood and Affect: Mood normal          Behavior: Behavior normal          Invasive Devices     Peripheral Intravenous Line  Duration           Peripheral IV 03/29/23 Right Antecubital <1 day    Peripheral IV 03/29/23 Right Wrist <1 day              Lab Results: Results: I have personally reviewed all pertinent laboratory/tests results    Imaging Results: I have personally reviewed pertinent films in PACS  Chest Xray(s): negative for acute findings   FAST exam(s): N/A   CT Scan(s): positive for acute findings: see reports   Additional Xray(s): N/A     Other Studies:     Code Status: Level 1 - Full Code  Advance Directive and Living Will:      Power of :    POLST:    I have spent 30 minutes with Patient  today in which greater than 50% of this time was spent in counseling/coordination of care regarding Prognosis, Patient and family education, Counseling / Coordination of care, Documenting in the medical record, Reviewing / ordering tests, medicine, procedures  , Obtaining or reviewing history   and Communicating with other healthcare professionals

## 2023-03-30 NOTE — PLAN OF CARE
Problem: MOBILITY - ADULT  Goal: Maintain or return to baseline ADL function  Description: INTERVENTIONS:  -  Assess patient's ability to carry out ADLs; assess patient's baseline for ADL function and identify physical deficits which impact ability to perform ADLs (bathing, care of mouth/teeth, toileting, grooming, dressing, etc )  - Assess/evaluate cause of self-care deficits   - Assess range of motion  - Assess patient's mobility; develop plan if impaired  - Assess patient's need for assistive devices and provide as appropriate  - Encourage maximum independence but intervene and supervise when necessary  - Involve family in performance of ADLs  - Assess for home care needs following discharge   - Consider OT consult to assist with ADL evaluation and planning for discharge  - Provide patient education as appropriate  Outcome: Progressing  Goal: Maintains/Returns to pre admission functional level  Description: INTERVENTIONS:  - Perform BMAT or MOVE assessment daily    - Set and communicate daily mobility goal to care team and patient/family/caregiver  - Collaborate with rehabilitation services on mobility goals if consulted  - Perform Range of Motion  times a day  - Reposition patient every  hours    - Dangle patient  times a day  - Stand patient  times a day  - Ambulate patient  times a day  - Out of bed to chair  times a day   - Out of bed for meals  times a day  - Out of bed for toileting  - Record patient progress and toleration of activity level   Outcome: Progressing     Problem: PAIN - ADULT  Goal: Verbalizes/displays adequate comfort level or baseline comfort level  Description: Interventions:  - Encourage patient to monitor pain and request assistance  - Assess pain using appropriate pain scale  - Administer analgesics based on type and severity of pain and evaluate response  - Implement non-pharmacological measures as appropriate and evaluate response  - Consider cultural and social influences on pain and pain management  - Notify physician/advanced practitioner if interventions unsuccessful or patient reports new pain  Outcome: Progressing     Problem: INFECTION - ADULT  Goal: Absence or prevention of progression during hospitalization  Description: INTERVENTIONS:  - Assess and monitor for signs and symptoms of infection  - Monitor lab/diagnostic results  - Monitor all insertion sites, i e  indwelling lines, tubes, and drains  - Monitor endotracheal if appropriate and nasal secretions for changes in amount and color  - Carpenter appropriate cooling/warming therapies per order  - Administer medications as ordered  - Instruct and encourage patient and family to use good hand hygiene technique  - Identify and instruct in appropriate isolation precautions for identified infection/condition  Outcome: Progressing  Goal: Absence of fever/infection during neutropenic period  Description: INTERVENTIONS:  - Monitor WBC    Outcome: Progressing     Problem: SAFETY ADULT  Goal: Maintain or return to baseline ADL function  Description: INTERVENTIONS:  -  Assess patient's ability to carry out ADLs; assess patient's baseline for ADL function and identify physical deficits which impact ability to perform ADLs (bathing, care of mouth/teeth, toileting, grooming, dressing, etc )  - Assess/evaluate cause of self-care deficits   - Assess range of motion  - Assess patient's mobility; develop plan if impaired  - Assess patient's need for assistive devices and provide as appropriate  - Encourage maximum independence but intervene and supervise when necessary  - Involve family in performance of ADLs  - Assess for home care needs following discharge   - Consider OT consult to assist with ADL evaluation and planning for discharge  - Provide patient education as appropriate  Outcome: Progressing  Goal: Maintains/Returns to pre admission functional level  Description: INTERVENTIONS:  - Perform BMAT or MOVE assessment daily    - Set and communicate daily mobility goal to care team and patient/family/caregiver  - Collaborate with rehabilitation services on mobility goals if consulted  - Perform Range of Motion  times a day  - Reposition patient every  hours    - Dangle patient  times a day  - Stand patient  times a day  - Ambulate patient  times a day  - Out of bed to chair  times a day   - Out of bed for meals  times a day  - Out of bed for toileting  - Record patient progress and toleration of activity level   Outcome: Progressing  Goal: Patient will remain free of falls  Description: INTERVENTIONS:  - Educate patient/family on patient safety including physical limitations  - Instruct patient to call for assistance with activity   - Consult OT/PT to assist with strengthening/mobility   - Keep Call bell within reach  - Keep bed low and locked with side rails adjusted as appropriate  - Keep care items and personal belongings within reach  - Initiate and maintain comfort rounds  - Make Fall Risk Sign visible to staff  - Offer Toileting every  Hours, in advance of need  - Initiate/Maintain alarm  - Obtain necessary fall risk management equipment:  - Apply yellow socks and bracelet for high fall risk patients  - Consider moving patient to room near nurses station  Outcome: Progressing     Problem: DISCHARGE PLANNING  Goal: Discharge to home or other facility with appropriate resources  Description: INTERVENTIONS:  - Identify barriers to discharge w/patient and caregiver  - Arrange for needed discharge resources and transportation as appropriate  - Identify discharge learning needs (meds, wound care, etc )  - Arrange for interpretive services to assist at discharge as needed  - Refer to Case Management Department for coordinating discharge planning if the patient needs post-hospital services based on physician/advanced practitioner order or complex needs related to functional status, cognitive ability, or social support system  Outcome: Progressing Problem: Knowledge Deficit  Goal: Patient/family/caregiver demonstrates understanding of disease process, treatment plan, medications, and discharge instructions  Description: Complete learning assessment and assess knowledge base  Interventions:  - Provide teaching at level of understanding  - Provide teaching via preferred learning methods  Outcome: Progressing     Problem: Nutrition/Hydration-ADULT  Goal: Nutrient/Hydration intake appropriate for improving, restoring or maintaining nutritional needs  Description: Monitor and assess patient's nutrition/hydration status for malnutrition  Collaborate with interdisciplinary team and initiate plan and interventions as ordered  Monitor patient's weight and dietary intake as ordered or per policy  Utilize nutrition screening tool and intervene as necessary  Determine patient's food preferences and provide high-protein, high-caloric foods as appropriate       INTERVENTIONS:  - Monitor oral intake, urinary output, labs, and treatment plans  - Assess nutrition and hydration status and recommend course of action  - Evaluate amount of meals eaten  - Assist patient with eating if necessary   - Allow adequate time for meals  - Recommend/ encourage appropriate diets, oral nutritional supplements, and vitamin/mineral supplements  - Order, calculate, and assess calorie counts as needed  - Recommend, monitor, and adjust tube feedings and TPN/PPN based on assessed needs  - Assess need for intravenous fluids  - Provide specific nutrition/hydration education as appropriate  - Include patient/family/caregiver in decisions related to nutrition  Outcome: Progressing

## 2023-03-30 NOTE — ASSESSMENT & PLAN NOTE
- secondary to fall at home  - bilateral  - coumadin reversed prior to transfer  - neurosurgery consult  - normal neuro exam

## 2023-03-30 NOTE — CONSULTS
Consultation - Geriatric Medicine   Heriberto Whitaker 80 y o  male MRN: [id number]  Unit/Bed#: ICU 03 Encounter: [id number]      Assessment/Plan     Ambulatory dysfunction with fall  -Reported mechanical fall at home on 3/27  -(+) head strike (-) loss of consciousness  -In setting of chronic systemic anticoagulation with Coumadin (afib)  -injuries as outlined below  -Denies history of recurrent falls  -Remains high risk future falls due to age, hx fall, deconditioning/debility and unfamiliar environment   -encourage good body mechanics and assist with all transfers  -keep personal items and call bell close to prevent reaching  -maintain environment free of fall hazards  -encourage appropriate footwear and adequate lighting at all times when out of bed  -consider home fall risk assessment and personal fall alert system if returning home  -PT and OT pending    Bilateral subdural hematomas  -S/p fall as outlined above  -CTH on admit reports bilateral subdural hematomas, bilateral parenchymal hematomas and right-sided subarachnoid hemorrhage  -INR 3 37 on initial presentation, reversed with [facility] and vitamin K  -AC/AP remain on hold  -Currently on Keppra for prophylaxis  -Neurovascular checks per protocol   -Nsx on consult   -Repeat CTH pending     Bilateral intraparenchymal hemorrhage  -see above     Right subarachnoid hemorrhage  -see above    Acute pain due to trauma  -Recommend pain control per Geriatric pain protocol:  Tylenol 975mg Q8H scheduled  Roxicodone 2 5mg Q4H PRN moderate pain  Roxicodone 5mg Q4H PRN severe pain  Dilaudid 0 2mg Q4H PRN  -Consider adjuncts such as topical lidocaine patch -patient in agreement, ordered for lower back pain   -encourage addition of non-pharmacologic pain treatment including ice and frequent repositioning  -recommend  bowel regimen to prevent and treat constipation due to increased risk with acute pain and opiate pain medications    Parkinsons disease  -Maintained on Sinemet chronically as outpatient however does not recall seeing neurology for anything other than prior MCA CVA  -?   Diagnosed by prior PCP per family  -Continue home Sinemet regimen and recommend o/p follow-up with Neurology    Left-sided hemiparesis  -As result of prior embolic MCA CVA   -High risk recurrent CVA, continue secondary to medications including good BP, lipid, and blood sugar continue environmental modifications to optimize patient's independence with mobility and self-care  -PT OT pending    Paroxysmal atrial fibrillation  -Maintained on Coumadin chronically outpatient, held on admission due to [address]  -Does not appear to be on any rate control medications  -Continue close patient follow-up with PCP and cardiology    Cognitive screening  -Alert and fully oriented, denies memory or cognitive concerns  -Reportedly independent with ADLs and IADLs at baseline aside from medications which wife assist with  -No prior cognitive testing on record for review  -CTH on admission reviewed, in addition to acute findings mild chronic microangiopathic changes and area of temporoparietal encephalomalacia noted  -No recent TSH or B12 on record review, recommend checking with next routine labs  -Encourage assistance uses device such as hearing aids at all appropriate time to reduce risk as impairment contributing to isolation, confusion, encephalopathy and more precipitous cognitive decline  -Encourage patient when physically, socially, cognitively active and engaged to maintain cognitive acuity    Impaired Hearing  -Requires use of hearing aids at baseline which were not available at time evaluation, hearing amplifier utilized in their place with notable benefit  -Encourage use of hearing amplifier at all appropriate times, patient interested in obtaining for home use, information regarding device utilized during hospitalization will be placed in patient's discharge paperwork   -encourage providers and caregivers to speak slowly and clearly directly to patient  -minimize background noise to encourage patient engagement  -Encourage use of teach back method to ensure clear communication    Chronic lower back pain  -History of vertebral compression fractures, and lumbar spinal stenosis  -Continue symptomatic management, will order lidocaine patch for topical use during hospitalization  -Continue outpatient follow-up with PCP for ongoing management    Delirium precautions  -Patient is high risk of delirium due to age, fall, traumatic injuries, intracerebral hemorrhage, hospitalization and ICU env  -Initiate delirium precautions  -maintain normal sleep/wake cycle  -minimize overnight interruptions, group overnight vitals/labs/nursing checks as possible  -dim lights, close blinds and turn off tv to minimize stimulation and encourage sleep environment in evenings  -ensure that pain is well controlled  -monitor for fecal and urinary retention which may precipitate delirium  -encourage early mobilization and ambulation with assistance once cleared to safely do so  -provide frequent reorientation and redirection  as indicated and appropriate  -encourage family and friends at the bedside to help help calm patient if anxious  -Minimize use of medications which may precipitate or worsen delirium such as tramadol, benzodiazepine, anticholinergics, and benadryl whenever possible  -encourage hydration and nutrition   -redirect unwanted behaviors as first line    Deconditioning/debility/frailty  -Clinical frailty scale stage V, mildly frail  -Multifactor including age, history of CVA, CHF, chronic back pain and multiple additional chronic medical comorbidities in elderly individual with extremely limited physiologic and metabolic reserve  -Albumin slightly low at 3 1, encourage well-balanced nutrition, consider nutritional supplements between meals if oral intake is poor  -Monitor for and treat any anxiety/depression symptoms of present as may impact patient response to therapies as well as overall sense of wellbeing and quality of life  -Continue optimization of chronic medical conditions and address acute derangements addressed  -Continue psychosocial supports of patient and family  -Ensure that treatments and interventions align with patient's wishes and goals of care    Home medication review    Will need to confirm with  [phone] when open during business hours as not open at time of initial consultation  Care coordination: Rounded with Katarzyna Hurt (RN)    History of Present Illness   Physician Requesting Consult: Blake Ragland MD  Reason for Consult / Principal Problem: Fall   Hx and PE limited by: N/A  Additional history obtained from: Chart review and patient evaluation    HPI: Tha Tolentino is a 80y o  year old male with 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, depression, hyperlipidemia, GERD, left-sided hemiplegia and hemiparesis, chronic systolic heart failure, atrial fibrillation, CAD, Parkinson's, hypertension, hypertensive heart disease, macular degeneration, lumbar neuritis, ischemic cardiomyopathy and AAA who is admitted to the trauma service with fall found to have subdural and subarachnoid hemorrhages on admission imaging, he is being seen in consultation by geriatrics for high risk developing delirium during hospitalization  Darci Cladera is seen and examined at the bedside where he is sitting resting comfortably, he explains that this past Monday he sustained a mechanical fall at home when he bent over and struck the left side of his face on the corner of a mirror on the way back up  He lost his balance and fell and was unable to get up without assistance and was later found down by his wife  He explains that today other than soreness in his lower back which is chronic he feels well overall and is in his usual state health  Prior to admission Darci Caldera was residing home with his wife over 72 years    He reports that at baseline he is dependent with ADLs and IADLs aside from medication management which his wife assists with  He has some residual left-sided weakness due to  history of embolic CVA now maintained on chronic systemic anticoagulation with Coumadin  He does not report any history of recurrent falls, is markedly impaired of hearing and uses hearing aids which are not available at time of evaluation and thus hearing amplifier was utilized in its place with notable benefit  He denies any memory or cognitive concerns and feels that he is managing well due to his age and comorbidities  Inpatient consult to Gerontology  Consult performed by: Ileana Silver DO  Consult ordered by: Jerry Lara MD        Review of Systems   Constitutional: Negative  Negative for chills and fever  HENT: Positive for hearing loss  Eyes: Negative  Respiratory: Negative  Cardiovascular: Negative  Gastrointestinal: Negative  Genitourinary: Negative  Musculoskeletal: Positive for back pain (Chronic lower back pain) and gait problem  Skin: Negative  Neurological: Positive for weakness (LUE and LLE)  Negative for numbness  Hematological: Negative  Psychiatric/Behavioral: Negative  All other systems reviewed and are negative      Historical Information   Past Medical History:   Diagnosis Date   • Parkinson disease ([facility] [address] )      Past Surgical History:   Procedure Laterality Date   • VASECTOMY       Social History   Social History     Substance and Sexual Activity   Alcohol Use Not Currently     Social History     Substance and Sexual Activity   Drug Use Never     Social History     Tobacco Use   Smoking Status Former   Smokeless Tobacco Never     Family History:   Family History   Problem Relation Age of Onset   • Heart disease Father    • COPD Mother      Meds/Allergies   all current active meds have been reviewed    Allergies   Allergen Reactions   • Penicillins Rash       Objective     Intake/Output Summary (Last 24 hours) at 3/30/2023 0630  Last data filed at 3/29/2023 2130  Gross per 24 hour   Intake 160 ml   Output --   Net 160 ml     Invasive Devices     Peripheral Intravenous Line  Duration           Peripheral IV 03/29/23 Right Antecubital <1 day    Peripheral IV 03/29/23 Right Wrist <1 day              Physical Exam  Vitals and nursing note reviewed  Constitutional:       General: He is not in acute distress  Comments: Thin, frail, elderly male   HENT:      Head: Normocephalic  Comments: Extensive ecchymosis left side of face     Nose: Nose normal       Mouth/Throat:      Mouth: Mucous membranes are dry  Comments: Dentition/denture in tact   Eyes:      General: No scleral icterus  Right eye: No discharge  Left eye: No discharge  Conjunctiva/sclera: Conjunctivae normal    Neck:      Comments: Trachea midline, phonation normal  Cardiovascular:      Rate and Rhythm: Normal rate and regular rhythm  Pulses: Normal pulses  Pulmonary:      Effort: Pulmonary effort is normal  No respiratory distress  Breath sounds: No wheezing  Abdominal:      General: There is no distension  Palpations: Abdomen is soft  Tenderness: There is no abdominal tenderness  Musculoskeletal:      Cervical back: Neck supple  Right lower leg: No edema  Left lower leg: No edema  Comments: Diffuse severe subcutaneous fat and muscle wasting most pronounced left upper extremity   Skin:     General: Skin is warm and dry  Comments: Thin and friable    Extensive ecchymosis left face and upper extremity   Neurological:      Mental Status: He is alert  Comments: Awake and alert, answers questions appropriately but repetitive and forgetful at times    Significant bilateral upper extremity tremor at rest   Psychiatric:      Comments: Very pleasant, cooperative, easily engaged       Lab Results:     I have personally reviewed pertinent lab results      I have personally reviewed pertinent imaging study reports in PACS       Therapies:   PT: Pending  OT: Pending    VTE Prophylaxis: Reason for no pharmacologic prophylaxis Acute intracerebral hemorrhage    Code Status: Level 3 - DNAR and DNI  Advance Directive and Living Will:      Power of :    POLST:      Family and Social Support: Wife    Goals of Care: Go home ASAP

## 2023-03-30 NOTE — PROGRESS NOTES
[facility]  Critical Care Progress Note  Date of Service: 3/30/2023  Hospital Day: 1  Name: Roland Thomason  MRN: [id number]  Unit/Bed#: ICU 03    Assessment/Plan   Neuro:   • Diagnosis: b/l Subdural hematoma, left intraparenchymal hematoma, right subarachnoid hemorrhage   ? Plan: 3/29 CTH- Moderate right and small left subdural hematomas, small right greater than left parenchymal hematomas and mild right subarachnoid hemorrhage  ? q1h neuro checks   ? Keppra for seizure prophylaxis  ? Maintain seizure precautions  ? Follow-up a m  CT head  ? Neurosurgery consulted  • Diagnosis: Acute pain  ? Plan: As needed Tylenol  • Diagnosis: Perkinson's  ? Plan: Continue Sinemet        CV:   • Diagnosis: atrial fibrillation  ? Plan: Home meds: Coumadin  ? Reversed with Kcentra and vitamin K  ? Monitor on telemetry  ? Rate controlled        Pulm:  No active issues     GI:   • Diagnosis: GERD   ? Plan: Continue home dose PPI        :   No active issues     F/E/N:   ? Replete electrolytes as needed  ? N p o         Heme/Onc:   • Diagnosis: DVT ppx  ? Plan: Hold chemical prophylaxis in setting of TBI  ? Maintain SCDs        Endo:   No active issues     ID:   No active issues     MSK/Skin:   • Diagnosis: Ambulatory dysfunction  ? Plan: PT/OT    Disposition: Critical care    ICU Core Measures     A: Assess, Prevent, and Manage Pain · Has pain been assessed? Yes  · Need for changes to pain regimen? No   B: Both SAT/SAT  · N/A   C: Choice of Sedation · RASS Goal: 0 Alert and Calm  · Need for changes to sedation or analgesia regimen? NA   D: Delirium · CAM-ICU: Negative   E: Early Mobility  · Plan for early mobility? Yes   F: Family Engagement · Plan for family engagement today?  Yes         Prophylaxis:  VTE    Stress Ulcer  covered bypantoprazole (PROTONIX) EC tablet 20 mg [[id number]]          Subjective   HPI/24hr events: Admitted overnight with bilateral subdural hematoma, left intraparenchymal hematoma, right subarachnoid hemorrhage         Review of Systems    Objective                            Vitals I/O      Most Recent Min/Max in 24hrs   Temp 98 6 °F (37 °C) Temp  Min: 97 4 °F (36 3 °C)  Max: 98 6 °F (37 °C)   Pulse 68 Pulse  Min: 61  Max: 98   Resp (!) 23 Resp  Min: 16  Max: 36   /60 BP  Min: 103/64  Max: 143/95   O2 Sat 94 % SpO2  Min: 92 %  Max: 100 %      Intake/Output Summary (Last 24 hours) at 3/30/2023 0442  Last data filed at 3/29/2023 2130  Gross per 24 hour   Intake 160 ml   Output --   Net 160 ml         Diet NPO     Invasive Monitoring Physical exam      Physical Exam  Vitals and nursing note reviewed  Constitutional:       Appearance: He is ill-appearing  HENT:      Head: Normocephalic  Right Ear: External ear normal       Left Ear: External ear normal       Nose: Nose normal       Mouth/Throat:      Mouth: Mucous membranes are moist    Eyes:      Pupils: Pupils are equal, round, and reactive to light  Comments: Left periorbital edema and bruising    Cardiovascular:      Rate and Rhythm: Normal rate  Pulses: Normal pulses  Pulmonary:      Effort: Pulmonary effort is normal    Abdominal:      Palpations: Abdomen is soft  Musculoskeletal:      Cervical back: Normal range of motion  Right lower leg: No edema  Left lower leg: No edema  Skin:     General: Skin is warm and dry  Capillary Refill: Capillary refill takes less than 2 seconds  Neurological:      Mental Status: He is alert and oriented to person, place, and time  Sensory: No sensory deficit  Motor: No weakness  Comments: Baseline tremor    Psychiatric:         Mood and Affect: Mood normal          Behavior: Behavior normal             Diagnostic Studies      EKG:   Imaging:  I have personally reviewed pertinent reports         Medications:  Scheduled PRN   atorvastatin, 40 mg, Daily  carbidopa-levodopa, 1 tablet, TID  chlorhexidine, 15 mL, Q12H [address]  DULoxetine, 30 mg, Daily  levETIRAcetam, 500 mg, Q12H CHEIKH  pantoprazole, 20 mg, Early Morning  senna-docusate sodium, 2 tablet, BID      acetaminophen, 650 mg, Q6H PRN  ondansetron, 4 mg, Q4H PRN       Continuous          Labs:    CBC    Recent Labs     03/29/23  1245   WBC 8 63   HGB 16 3   HCT 49 3        BMP    Recent Labs     03/29/23  1245   SODIUM 139   K 4 2      CO2 31   AGAP 4   BUN 13   CREATININE 0 89   CALCIUM 9 2       Coags    Recent Labs     03/29/23  1245 03/29/23  1928   INR 3 37* 1 20*   PTT 44*  --         Additional Electrolytes  No recent results       Blood Gas    No recent results  No recent results LFTs  No recent results    Infectious  No recent results  Glucose  Recent Labs     03/29/23  1245   GLUC 97               Critical Care Time Delivered: Upon my evaluation, this patient had a high probability of imminent or life-threatening deterioration due to TBI, which required my direct attention, intervention, and personal management  I have personally provided 25 minutes of critical care time, exclusive of procedures, teaching, family meetings, and any prior time recorded by providers other than myself       ANITHA Rawls

## 2023-03-30 NOTE — PLAN OF CARE
Problem: MOBILITY - ADULT  Goal: Maintain or return to baseline ADL function  Description: INTERVENTIONS:  -  Assess patient's ability to carry out ADLs; assess patient's baseline for ADL function and identify physical deficits which impact ability to perform ADLs (bathing, care of mouth/teeth, toileting, grooming, dressing, etc )  - Assess/evaluate cause of self-care deficits   - Assess range of motion  - Assess patient's mobility; develop plan if impaired  - Assess patient's need for assistive devices and provide as appropriate  - Encourage maximum independence but intervene and supervise when necessary  - Involve family in performance of ADLs  - Assess for home care needs following discharge   - Consider OT consult to assist with ADL evaluation and planning for discharge  - Provide patient education as appropriate  Outcome: Progressing  Goal: Maintains/Returns to pre admission functional level  Description: INTERVENTIONS:  - Perform BMAT or MOVE assessment daily    - Set and communicate daily mobility goal to care team and patient/family/caregiver  - Collaborate with rehabilitation services on mobility goals if consulted  - Perform Range of Motion  times a day  - Reposition patient every  hours    - Dangle patient  times a day  - Stand patient  times a day  - Ambulate patient times a day  - Out of bed to chair  times a day   - Out of bed for meals  times a day  - Out of bed for toileting  - Record patient progress and toleration of activity level   Outcome: Progressing     Problem: PAIN - ADULT  Goal: Verbalizes/displays adequate comfort level or baseline comfort level  Description: Interventions:  - Encourage patient to monitor pain and request assistance  - Assess pain using appropriate pain scale  - Administer analgesics based on type and severity of pain and evaluate response  - Implement non-pharmacological measures as appropriate and evaluate response  - Consider cultural and social influences on pain and pain management  - Notify physician/advanced practitioner if interventions unsuccessful or patient reports new pain  Outcome: Progressing     Problem: INFECTION - ADULT  Goal: Absence or prevention of progression during hospitalization  Description: INTERVENTIONS:  - Assess and monitor for signs and symptoms of infection  - Monitor lab/diagnostic results  - Monitor all insertion sites, i e  indwelling lines, tubes, and drains  - Monitor endotracheal if appropriate and nasal secretions for changes in amount and color  - [place] appropriate cooling/warming therapies per order  - Administer medications as ordered  - Instruct and encourage patient and family to use good hand hygiene technique  - Identify and instruct in appropriate isolation precautions for identified infection/condition  Outcome: Progressing  Goal: Absence of fever/infection during neutropenic period  Description: INTERVENTIONS:  - Monitor WBC    Outcome: Progressing     Problem: SAFETY ADULT  Goal: Maintain or return to baseline ADL function  Description: INTERVENTIONS:  -  Assess patient's ability to carry out ADLs; assess patient's baseline for ADL function and identify physical deficits which impact ability to perform ADLs (bathing, care of mouth/teeth, toileting, grooming, dressing, etc )  - Assess/evaluate cause of self-care deficits   - Assess range of motion  - Assess patient's mobility; develop plan if impaired  - Assess patient's need for assistive devices and provide as appropriate  - Encourage maximum independence but intervene and supervise when necessary  - Involve family in performance of ADLs  - Assess for home care needs following discharge   - Consider OT consult to assist with ADL evaluation and planning for discharge  - Provide patient education as appropriate  Outcome: Progressing  Goal: Maintains/Returns to pre admission functional level  Description: INTERVENTIONS:  - Perform BMAT or MOVE assessment daily    - Set and communicate daily mobility goal to care team and patient/family/caregiver  - Collaborate with rehabilitation services on mobility goals if consulted  - Perform Range of Motion  times a day  - Reposition patient every  hours    - Dangle patient  times a day  - Stand patient  times a day  - Ambulate patient  times a day  - Out of bed to chair  times a day   - Out of bed for meals  times a day  - Out of bed for toileting  - Record patient progress and toleration of activity level   Outcome: Progressing  Goal: Patient will remain free of falls  Description: INTERVENTIONS:  - Educate patient/family on patient safety including physical limitations  - Instruct patient to call for assistance with activity   - Consult OT/PT to assist with strengthening/mobility   - Keep Call bell within reach  - Keep bed low and locked with side rails adjusted as appropriate  - Keep care items and personal belongings within reach  - Initiate and maintain comfort rounds  - Make Fall Risk Sign visible to staff  - Offer Toileting every  Hours, in advance of need  - Initiate/Maintain alarm  - Obtain necessary fall risk management equipment:   - Apply yellow socks and bracelet for high fall risk patients  - Consider moving patient to room near nurses station  Outcome: Progressing     Problem: DISCHARGE PLANNING  Goal: Discharge to home or other facility with appropriate resources  Description: INTERVENTIONS:  - Identify barriers to discharge w/patient and caregiver  - Arrange for needed discharge resources and transportation as appropriate  - Identify discharge learning needs (meds, wound care, etc )  - Arrange for interpretive services to assist at discharge as needed  - Refer to Case Management Department for coordinating discharge planning if the patient needs post-hospital services based on physician/advanced practitioner order or complex needs related to functional status, cognitive ability, or social support system  Outcome: Progressing Problem: Knowledge Deficit  Goal: Patient/family/caregiver demonstrates understanding of disease process, treatment plan, medications, and discharge instructions  Description: Complete learning assessment and assess knowledge base  Interventions:  - Provide teaching at level of understanding  - Provide teaching via preferred learning methods  Outcome: Progressing     Problem: Nutrition/Hydration-ADULT  Goal: Nutrient/Hydration intake appropriate for improving, restoring or maintaining nutritional needs  Description: Monitor and assess patient's nutrition/hydration status for malnutrition  Collaborate with interdisciplinary team and initiate plan and interventions as ordered  Monitor patient's weight and dietary intake as ordered or per policy  Utilize nutrition screening tool and intervene as necessary  Determine patient's food preferences and provide high-protein, high-caloric foods as appropriate       INTERVENTIONS:  - Monitor oral intake, urinary output, labs, and treatment plans  - Assess nutrition and hydration status and recommend course of action  - Evaluate amount of meals eaten  - Assist patient with eating if necessary   - Allow adequate time for meals  - Recommend/ encourage appropriate diets, oral nutritional supplements, and vitamin/mineral supplements  - Order, calculate, and assess calorie counts as needed  - Recommend, monitor, and adjust tube feedings and TPN/PPN based on assessed needs  - Assess need for intravenous fluids  - Provide specific nutrition/hydration education as appropriate  - Include patient/family/caregiver in decisions related to nutrition  Outcome: Progressing

## 2023-03-30 NOTE — CASE MANAGEMENT
Case Management Discharge Planning Note    Patient name Deven Momin  Location [facility]10-75 MRN [id number]  : 1933 Date 3/30/2023       Current Admission Date: 3/29/2023  Current Admission Diagnosis:SAH (subarachnoid hemorrhage) [facility])   Patient Active Problem List    Diagnosis Date Noted   • Fall from standing 2023   • SDH (subdural hematoma) 2023   • SAH (subarachnoid hemorrhage) ([address] ) 2023      LOS (days): 1  Geometric Mean LOS (GMLOS) (days): 2 90  Days to GMLOS:2     OBJECTIVE:  Risk of Unplanned Readmission Score: 11 17         Current admission status: Inpatient   Preferred Pharmacy:   [facility], [address] [address]  Phone: [phone] Fax: [phone]    Primary Care Provider: Kelvin Araya DO    Primary Insurance: Alejandra Monsivais [facility] REP  Secondary Insurance:     DISCHARGE DETAILS:        Pt evaluated by OT/PT and recommended for IP rehab  CM left voicemail for pt's wife to discuss d/c planning and options

## 2023-03-30 NOTE — PLAN OF CARE
Problem: PHYSICAL THERAPY ADULT  Goal: Performs mobility at highest level of function for planned discharge setting  See evaluation for individualized goals  Description: Treatment/Interventions: OT, Spoke to nursing, Spoke to case management, Gait training, Bed mobility, Patient/family training, Endurance training, LE strengthening/ROM, Functional transfer training          See flowsheet documentation for full assessment, interventions and recommendations  Note: Prognosis: Good  Problem List: Decreased strength, Decreased range of motion, Decreased endurance, Impaired balance, Decreased mobility, Decreased coordination, Decreased cognition, Impaired judgement, Decreased safety awareness, Pain  Assessment: Pt is 80 y o  male seen for PT evaluation s/p admit to [facility] on 3/29/2023 w/ B/L SAH (subarachnoid hemorrhage) (HCC) & B/L SDH s/p fall  PT consulted to assess pt's functional mobility and d/c needs  Order placed for PT eval and tx, w/ up w/ A order  Comorbidities affecting pt's physical performance at time of assessment include: PMHx of PD and prior CVA with residual L sided weakness  PTA, pt was ambulates community distances and elevations, lives in multi-level home, retired physical therapist and physical therapy instructor and ambulates with [address] post CVA  Personal factors affecting pt at time of IE include: ambulating w/ assistive device, inability to navigate level surfaces w/o external assistance, limited home support, positive fall history, unable to perform physical activity, inability to perform IADLs and inability to perform ADLs  Please find objective findings from PT assessment regarding body systems outlined above with impairments and limitations including weakness, impaired balance, decreased endurance, impaired coordination, gait deviations, pain, decreased activity tolerance, decreased functional mobility tolerance, decreased safety awareness, fall risk and decreased cognition  Pt required increased time and A to complete bed mobility  A for transfers with deficits in balance  Ambulated with mildly unsteady gait without LOB using SPC  High risk of additional falls with noted deficits in strength, balance, and safety awareness with acute head strike and known baseline deficits from PD and prior CVA  The following objective measures performed on IE also reveal limitations: The patient's AM-PAC Basic Mobility Inpatient Short Form Raw Score is 14, Standardized Score is 35 55  A standardized score less than 42 9 suggests the patient may benefit from discharge to post-acute rehabilitation services  Please also refer to the recommendation of the Physical Therapist for safe discharge planning  Pt's clinical presentation is currently unstable/unpredictable seen in pt's presentation of critical care monitoring  Pt to benefit from continued PT tx to address deficits as defined above and maximize level of functional independent mobility and consistency  From PT/mobility standpoint, recommendation at time of d/c would be post acute rehabilitation services pending progress in order to facilitate return to PLOF  Barriers to Discharge: Inaccessible home environment, Decreased caregiver support     PT Discharge Recommendation: Post acute rehabilitation services    See flowsheet documentation for full assessment

## 2023-03-30 NOTE — OCCUPATIONAL THERAPY NOTE
"    Occupational Therapy Evaluation     Patient Name: Nereyda Crouch  OQFKS'X Date: 3/30/2023  Problem List  Active Problems:    Fall from standing    SDH (subdural hematoma)    SAH (subarachnoid hemorrhage) [facility])    Past Medical History  Past Medical History:   Diagnosis Date    Parkinson disease [facility])      Past Surgical History  Past Surgical History:   Procedure Laterality Date    VASECTOMY             03/30/23 1055   OT Last Visit   OT Visit Date 03/30/23   Note Type   Note type Evaluation   Pain Assessment   Pain Score No Pain   Restrictions/Precautions   Weight Bearing Precautions Per Order No   Other Precautions Cognitive; Chair Alarm; Fall Risk;Telemetry;Multiple lines, Hard of hearing   Home Living   Type of [address] [place] [address] Two level;1/2 bath on main level;Bed/bath upstairs   Bathroom Shower/Tub Walk-in shower   Bathroom Toilet Standard   Bathroom Equipment Grab bars in shower; Shower chair;Grab bars around toilet   [address] Cane;Walker   Additional Comments Pt lives in a 2 story home with1/2 bath on main level, FFOS to second floor however pt has a stair glide from first to second level  Was using SPC at baseline for all mobility needs   Prior Function   Level of [place] Independent with functional mobility; Needs assistance with ADLs; Needs assistance with IADLS   Lives With Spouse; Son   Spcalvin Kenyon Help From Family   IADLs Family/Friend/Other provides transportation; Family/Friend/Other provides meals; Family/Friend/Other provides medication management   Falls in the last 6 months 1 to 4   Vocational Retired   Lifestyle   Autonomy Assist with ADLS and IADLS Does not drive   Reciprocal Relationships supportive spouse and son   Service to Others retired Physical Therapist   Intrinsic Gratification Watching TV, listening to audio books   Subjective   Subjective \"What is your name again? \"   ADL   Where Assessed Chair   Eating Assistance 5  Supervision/Setup " Grooming Assistance 5  Supervision/Setup   UB Bathing Assistance 4  Minimal Assistance   LB Bathing Assistance 3  Moderate Assistance   UB Dressing Assistance 4  Minimal Assistance   LB Dressing Assistance 3  Moderate Assistance   Toileting Assistance  3  Moderate Assistance   Bed Mobility   Supine to Sit 3  Moderate assistance   Additional items Assist x 1   Additional Comments OOB at end of session   Transfers   Sit to Stand 3  Moderate assistance   Additional items Assist x 1   Stand to Sit 3  Moderate assistance   Additional items Assist x 1   Stand pivot 3  Moderate assistance   Additional items Assist x 1   Additional Comments SPC   Functional Mobility   Functional Mobility 3  Moderate assistance   Additional Comments Ax1   Additional items SPC   Balance   Static Sitting Fair   Dynamic Sitting Fair -   Static Standing Poor +   Dynamic Standing Poor   Ambulatory Poor   Activity Tolerance   Activity Tolerance Patient limited by fatigue   Medical Staff Made Aware DPT, NSG aware   RUE Assessment   RUE Assessment WFL   LUE Assessment   LUE Assessment WFL  (Residual deficits from h/o CVA)   Vision-Basic Assessment   Current Vision Wears glasses all the time   Psychosocial   Psychosocial (WDL) WDL   Cognition   Overall Cognitive Status Impaired   Arousal/Participation Alert; Responsive; Cooperative   Attention Attends with cues to redirect   Orientation Level Oriented X4   Memory Decreased recall of recent events   Following Commands Follows one step commands with increased time or repetition   Comments Overall pleasant and cooeprative   Assessment   Limitation Decreased ADL status; Decreased UE strength;Decreased Safe judgement during ADL;Decreased cognition;Decreased endurance;Decreased self-care trans;Decreased high-level ADLs   Prognosis Good   Assessment Pt is a 80 y o  male seen for OT evaluation s/p admit to [facility] on 3/29/2023 w/ <principal problem not specified>   Pt presents to ED s/p fall with + head strike, pt found to have acute BL SDH and SAH with R>L  Comorbidities affecting pt's functional performance at time of assessment include: Parkinson disease, CVA, fall from standing  Personal factors affecting pt at time of IE include:difficulty performing ADLS, difficulty performing IADLS , limited insight into deficits, flat affect, decreased initiation and engagement  and health management   Prior to admission, pt was requiring assist with ADLS and IADLS  Upon evaluation: Pt presents supine and is agreeable to OTIE  All vitals WNL  Pt requires overall Mod A 2* the following deficits impacting occupational performance: weakness, decreased strength, decreased balance, decreased tolerance, impaired problem solving, decreased safety awareness and decreased coping skills  Pt resting in chair at end of session with all needs in reach, alarm on, all lines in place and SCD's on  Pt to benefit from continued skilled OT tx while in the hospital to address deficits as defined above and maximize level of functional independence w ADL's and functional mobility  Occupational Performance areas to address include: grooming, bathing/shower, toilet hygiene, dressing, health maintenance, functional mobility, community mobility, clothing management and social participation  The patient's raw score on the -PAC Daily Activity inpatient short form is 17  , standardized score is 37 26  , less than 39 4  Patients at this level are likely to benefit from discharge to post-acute rehabilitation services  Please refer to the recommendation of the Occupational Therapist for safe discharge planning  Goals   Patient Goals To get walking   Plan   Treatment Interventions ADL retraining;Functional transfer training;UE strengthening/ROM; Cognitive reorientation; Endurance training;Patient/family training;Neuromuscular reeducation;UE splinting;Continued evaluation; Energy conservation; Activityengagement   Goal Expiration Date 04/13/23   OT Frequency 3-5x/wk Recommendation   OT Discharge Recommendation Post acute rehabilitation services   AM-PAC Daily Activity Inpatient   Lower Body Dressing 2   Bathing 2   Toileting 2   Upper Body Dressing 3   Grooming 4   Eating 4   Daily Activity Raw Score 17   Daily Activity Standardized Score (Calc for Raw Score >=11) 37 26   AM-PAC Applied Cognition Inpatient   Following a Speech/Presentation 3   Understanding Ordinary Conversation 4   Taking Medications 4   Remembering Where Things Are Placed or Put Away 3   Remembering List of 4-5 Errands 2   Taking Care of Complicated Tasks 2   Applied Cognition Raw Score 18   Applied Cognition Standardized Score 38 07     OT goals to be addressed in the next 14 days:    Pt will increase activity tolerance to G for 30 min txment sessions    Pt will complete UB/LB dressing/self care w/ S using adaptive device and DME as needed    Pt will complete toileting w/ mod I w/ G hygiene/thoroughness using DME as needed    Pt will improve functional transfers to Mod I on/off all surfaces using DME as needed w/ G balance/safety     Pt will improve functional mobility during ADL/IADL/leisure tasks to Mod I using DME as needed w/ G balance/safety     Pt will demonstrate G carryover of pt/caregiver education and training as appropriate  9  Pt will demonstrate 100% carryover of energy conservation techniques t/o functional I/ADL/leisure tasks w/o cues s/p skilled education    Pt will independently identify and utilize 2-3 coping strategies to increase positive affect and promote overall well-being      Pt will engage in ongoing cognitive assessment w/ G participation to assist w/ safe d/c planning/recommendations

## 2023-03-30 NOTE — PHYSICAL THERAPY NOTE
Physical Therapy Evaluation     Patient's Name: Bethany Osborne    Admitting Diagnosis  SAH (subarachnoid hemorrhage) ([facility] ) [I60 9]  SDH (subdural hematoma) ([facility] ) [S06  5XAA]  Intraparenchymal hemorrhage of brain ([facility] ) [I61 9]  Contusion of face, initial encounter [S00 83XA]  Unspecified multiple injuries, initial encounter [T07  XXXA]    Problem List  Patient Active Problem List   Diagnosis    Fall from standing    SDH (subdural hematoma)    SAH (subarachnoid hemorrhage) (HCC)       Past Medical History  Past Medical History:   Diagnosis Date    Parkinson disease [facility])        Past Surgical History  Past Surgical History:   Procedure Laterality Date    VASECTOMY          03/30/23 1056   PT Last Visit   PT Visit Date 03/30/23   Note Type   Note type Evaluation   Pain Assessment   Pain Assessment Tool FLACC   Pain Score No Pain   Pain Rating: FLACC (Rest) - Face 0   Pain Rating: FLACC (Rest) - Legs 0   Pain Rating: FLACC (Rest) - Activity 0   Pain Rating: FLACC (Rest) - Cry 0   Pain Rating: FLACC (Rest) - Consolability 0   Score: FLACC (Rest) 0   Pain Rating: FLACC (Activity) - Face 0   Pain Rating: FLACC (Activity) - Legs 0   Pain Rating: FLACC (Activity) - Activity 0   Pain Rating: FLACC (Activity) - Cry 0   Pain Rating: FLACC (Activity) - Consolability 0   Score: FLACC (Activity) 0   Restrictions/Precautions   Weight Bearing Precautions Per Order No   Other Precautions Cognitive; Chair Alarm; Bed Alarm;Pain; Fall Risk;Multiple lines   Home Living   Type of [address] [place] [address] Two level;1/2 bath on main level   Bathroom Shower/Tub Walk-in shower   Bathroom Toilet Standard   Bathroom Equipment Grab bars in shower   [address] Cane;Walker   Additional Comments Pt reports using SPC at baseline   Prior Function   Level of [place] Independent with functional mobility   Lives With Spouse; [facility] [place] [facility] in the last 6 months 1 to 4   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)   Cognition   Orientation Level Oriented X4   Subjective   Subjective Pt willing and agreeable to PT session   RLE Assessment   RLE Assessment WFL   LLE Assessment   LLE Assessment WFL  (mild baseline weakness from prior CVA)   Coordination   Movements are Fluid and Coordinated 0   Coordination and Movement Description history of PD and prior CVA with residual L sided weakness   Bed Mobility   Supine to Sit 3  Moderate assistance   Additional items Assist x 1   Additional Comments Pt left resting in chair, call bell in reach, chair alarm active   Transfers   Sit to Stand 3  Moderate assistance   Additional items Assist x 1   Stand to Sit 3  Moderate assistance   Additional items Assist x 1   Stand pivot 3  Moderate assistance   Additional items Assist x 1   Ambulation/Elevation   Gait pattern Shuffling;Decreased foot clearance; Forward Flexion; Excessively slow; Short stride   Gait Assistance 3  Moderate assist   Additional items Assist x 1   Assistive Device [address]   Distance 11   Balance   Static Sitting Fair   Dynamic Sitting Fair -   Static Standing Poor +   Dynamic Standing Poor   Ambulatory Poor   Endurance Deficit   Endurance Deficit Yes   Endurance Deficit Description limited by fatigue compared to baseline mobility   Activity Tolerance   Activity Tolerance Patient limited by fatigue;Patient limited by pain   Medical Staff Made Aware OT for D/C planning   Nurse Made Aware yes, nsg gave clearance to work with pt, updated on pt progress   Assessment   Prognosis Good   Problem List Decreased strength;Decreased range of motion;Decreased endurance; Impaired balance;Decreased mobility; Decreased coordination;Decreased cognition; Impaired judgement;Decreased safety awareness;Pain   Assessment Pt is 80 y o  male seen for PT evaluation s/p admit to [facility] on 3/29/2023 w/ B/L SAH (subarachnoid hemorrhage) (HCC) & B/L SDH s/p fall   PT consulted to assess pt's functional mobility and d/c needs  Order placed for PT eval and tx, w/ up w/ A order  Comorbidities affecting pt's physical performance at time of assessment include: PMHx of PD and prior CVA with residual L sided weakness  PTA, pt was ambulates community distances and elevations, lives in multi-level home, retired physical therapist and physical therapy instructor and ambulates with [facility] post CVA  Personal factors affecting pt at time of IE include: ambulating w/ assistive device, inability to navigate level surfaces w/o external assistance, limited home support, positive fall history, unable to perform physical activity, inability to perform IADLs and inability to perform ADLs  Please find objective findings from PT assessment regarding body systems outlined above with impairments and limitations including weakness, impaired balance, decreased endurance, impaired coordination, gait deviations, pain, decreased activity tolerance, decreased functional mobility tolerance, decreased safety awareness, fall risk and decreased cognition  Pt required increased time and A to complete bed mobility  A for transfers with deficits in balance  Ambulated with mildly unsteady gait without LOB using SPC  High risk of additional falls with noted deficits in strength, balance, and safety awareness with acute head strike and known baseline deficits from PD and prior CVA  The following objective measures performed on IE also reveal limitations: The patient's AM-PAC Basic Mobility Inpatient Short Form Raw Score is 14, Standardized Score is 35 55  A standardized score less than 42 9 suggests the patient may benefit from discharge to post-acute rehabilitation services  Please also refer to the recommendation of the Physical Therapist for safe discharge planning  Pt's clinical presentation is currently unstable/unpredictable seen in pt's presentation of critical care monitoring   Pt to benefit from continued PT tx to address deficits as defined above and maximize level of functional independent mobility and consistency  From PT/mobility standpoint, recommendation at time of d/c would be post acute rehabilitation services pending progress in order to facilitate return to PLOF  Barriers to Discharge Inaccessible home environment;Decreased caregiver support   Goals   Patient Goals To walk   STG Expiration Date 04/11/23   Short Term Goal #1 1  Complete bed mobility and transfers I to decrease need for caregiver in home  2  Ambulate 300' I to complete household and community mobility without A  3  Improve dynamic balance to good to decrease need for UE support during ambulation  4  Be educated & demonstate 12 steps to be able to enter home without A  Plan   Treatment/Interventions OT; Spoke to nursing;Spoke to case management;Gait training;Bed mobility; Patient/family training; Endurance training;LE strengthening/ROM; Functional transfer training   PT Frequency 3-5x/wk   Recommendation   PT Discharge Recommendation Post acute rehabilitation services   AM-PAC Basic Mobility Inpatient   Turning in Flat Bed Without Bedrails 3   Lying on Back to Sitting on Edge of Flat Bed Without Bedrails 3   Moving Bed to Chair 2   Standing Up From Chair Using Arms 2   Walk in Room 2   Climb 3-5 Stairs With Railing 2   Basic Mobility Inpatient Raw Score 14   Basic Mobility Standardized Score 35 55   Highest Level Of Mobility   JH-HLM Goal 4: Move to chair/commode   JH-HLM Achieved 6: Walk 10 steps or more           Sadie Lopez, PT

## 2023-03-30 NOTE — PROGRESS NOTES
Progress Note - Trauma Tertiary Survey/Transfer Note   Yoselin Soriano 80 y o  male [id number]   Unit/Bed#: ICU 03 Encounter: [id number]     Assessment & Plan   Summary of Diagnosed Injuries: Moderate R>L subdural hematomas, R>L parenchymal hematomas, R subarachnoid hemorrhage (stablized on repeat head CT)    PLAN:  - Downgrade to med/surg; trauma team primary  - Keppra for antiepileptic prophylaxis x7d  - Repeat head CT Saturday per neurosurgery (f/u w/ their team in 2w)  - Multimodal analgesics/antiemetics PRN  - Continue holding PTA Coumadin, OK for DVT ppx per neurosurgery, will begin SQH  - Continue home PPI  - PT/OT evaluations  - Regular diet as tolerated    VTE Prophylaxis:Heparin     Disposition: Med/Surg    Code status:  Level 3 - DNAR and DNI    Consultants: IP CONSULT TO GERONTOLOGY  IP CONSULT TO NEUROSURGERY  IP CONSULT TO CASE MANAGEMENT  IP CONSULT TO GERONTOLOGY     Subjective   Transfer from: [address] [place] [address]    Mechanism of Injury: Fall     Chief Complaint: s/p fall (main complaint: chronic back pain)    HPI/Last 24 hour events: No events since his admission  This AM the patient underwent a repeat head CT w/ stabilization of his SDH/SAH  Neurosurgery has recommended a repeat head CT on Saturday but otherwise there were no new recommendations  Patient w/ chronic back pain and mild chronic neck pain which are both unchanged from baseline  GCS15, A&Ox3  Plan for downgrade to the floor under trauma primary  Objective   Vitals:   Temp:  [97 8 °F (36 6 °C)-98 6 °F (37 °C)] 98 °F (36 7 °C)  HR:  [60-98] 66  Resp:  [17-36] 18  BP: ()/(53-95) 114/59    I/O       03/28 0701  03/29 0700 03/29 0701  03/30 0700 03/30 0701  03/31 0700    P  O   30     I V  (mL/kg)  30 (0 5)     IV Piggyback  100 100    Total Intake(mL/kg)  160 (2 5) 100 (1 5)    Urine (mL/kg/hr)   200 (0 6)    Stool   0    Total Output   200    Net  +160 -100           Unmeasured Urine Occurrence  2 x 1 x    Unmeasured Stool Occurrence  1 x 1 x Physical Exam:   General: Awake, alert, and in no acute distress lying supine on the hospital bed  HEENT: Normocephalic, atraumatic; pupils 3mm and reactive to light bilaterally; left periorbital ecchymosis w/ small 4mm superficial skin abrasion to anterior left temple; intact ocular muscules  Cardiovascular: Regular rate  Chest: Completely non-tender to palpation w/ no bony deformities or visible skin abnormalities  Respiratory: In no acute respiratory distress with no use of accessory muscles of respiration, symmetrical rise/fall of the chest  Abdomen: Soft, non-distended, and completely non-TTP w/ no guarding/rigidity or signs of peritonitis  Extremities: No visible wounds/ulcerations to the bilateral upper extremities; symmetrical strength throughout upper/lower extremities  Skin: Warm and well-perfused  Neurological: No obvious focal deficits  Psychiatric: Appropriate mood/affect      Invasive Devices     Peripheral Intravenous Line  Duration           Peripheral IV 03/29/23 Right Antecubital <1 day    Peripheral IV 03/29/23 Right Wrist <1 day                   1  Before the illness or injury that brought you to the Emergency, did you need someone to help you on a regular basis? 1=Yes   2  Since the illness or injury that brought you to the Emergency, have you needed more help than usual to take care of yourself? 0=No   3  Have you been hospitalized for one or more nights during the past 6 months (excluding a stay in the Emergency Department)? 0=No   4  In general, do you see well? 0=Yes   5  In general, do you have serious problems with your memory? 0=No   6  Do you take more than three different medications everyday? 1=Yes   TOTAL   2     Did you order a geriatric consult if the score was 2 or greater?: yes         Lab Results: Results: I have personally reviewed all pertinent laboratory/tests results    Imaging Results: I have personally reviewed pertinent reports      Chest Xray(s): negative for acute findings   FAST exam(s): negative for acute findings   CT Scan(s): positive for acute findings:  Moderate R>L subdural hematomas, R>L parenchymal hematomas, R subarachnoid hemorrhage (stablized on repeat head CT)   Additional Xray(s): negative for acute findings

## 2023-03-30 NOTE — TELEPHONE ENCOUNTER
3/31/23- PT IN HOSPITAL    3/30/23- [address] F/U SCHEDULED 23  PT WILL NEED CTH    ----- Message from Simone Lomas PA-C sent at 3/30/2023  9:57 AM EDT -----  Regardin week hospital follow-up for SDH/SAH  Hi,   Can we please schedule this patient for a 2 week hospital follow-up appointment to follow-up SDH/SAH? Will need a repeat CTH and to be seen by an AP       Thank you,   Honorio Price

## 2023-03-30 NOTE — ASSESSMENT & PLAN NOTE
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)  Stable size and configuration of the ventricles, within normal limits for age  Right ex vacuo lateral ventricle dilatation  No intraventricular hemorrhage  Plan:   · Continue to closely monitor neuro exam   · Frequent neuro checks per primary team   · Repeat STAT CTH with any acute decline in GCS   · Maintain normotensive BP goals, SBP < 160   · No acute neurosurgical intervention indicated at this time   · Imaging reviewed this am on rounds with attending   · with stable repeat CTH and no new focal neuro deficits, no indication for surgical evacuation   · Given extent of bleed, recommend repeat CTH on Saturday, 4/1, for close surveillance   · Continue keppra 500mg IV q 12hrs x 7 days for seizure ppx per trauma team   · Hold all AC/AP meds   · Home coumadin to be held until cleared by nsgy in the office   · DVT ppx: SCDs, okay for chem dvt ppx with SQH from a nsgy standpoint   · pain control per primary team   · PT/OT   · Medical management per primary team   · Continue home meds as ordered by primary team  · Social work following for assistance with dispo once medically cleared     Neurosurgery will follow from the periphery and review imaging once it's completed  Pt will be scheduled to be seen in the nsgy office in 2 weeks with a repeat CTH for follow-up and potentially clearance to resume his coumadin   Please reach out with any further questions or concerns

## 2023-03-30 NOTE — PLAN OF CARE
Problem: OCCUPATIONAL THERAPY ADULT  Goal: Performs self-care activities at highest level of function for planned discharge setting  See evaluation for individualized goals  Description: Treatment Interventions: ADL retraining, Functional transfer training, UE strengthening/ROM, Cognitive reorientation, Endurance training, Patient/family training, Neuromuscular reeducation, UE splinting, Continued evaluation, Energy conservation, Activityengagement          See flowsheet documentation for full assessment, interventions and recommendations  3/30/2023 1254 by Mariah Carter OT  Note: Limitation: Decreased ADL status, Decreased UE strength, Decreased Safe judgement during ADL, Decreased cognition, Decreased endurance, Decreased self-care trans, Decreased high-level ADLs  Prognosis: Good  Assessment: Pt is a 80 y o  male seen for OT evaluation s/p admit to [facility] on 3/29/2023 w/ <principal problem not specified>  Pt presents to ED s/p fall with + head strike, pt found to have acute BL SDH and SAH with R>L  Comorbidities affecting pt's functional performance at time of assessment include: Parkinson disease, CVA, fall from standing  Personal factors affecting pt at time of IE include:difficulty performing ADLS, difficulty performing IADLS , limited insight into deficits, flat affect, decreased initiation and engagement  and health management   Prior to admission, pt was requiring assist with ADLS and IADLS  Upon evaluation: Pt presents supine and is agreeable to OTIE  All vitals WNL  Pt requires overall Mod A 2* the following deficits impacting occupational performance: weakness, decreased strength, decreased balance, decreased tolerance, impaired problem solving, decreased safety awareness and decreased coping skills  Pt resting in chair at end of session with all needs in reach, alarm on, all lines in place and SCD's on   Pt to benefit from continued skilled OT tx while in the hospital to address deficits as defined above and maximize level of functional independence w ADL's and functional mobility  Occupational Performance areas to address include: grooming, bathing/shower, toilet hygiene, dressing, health maintenance, functional mobility, community mobility, clothing management and social participation  The patient's raw score on the AM-PAC Daily Activity inpatient short form is 17  , standardized score is 37 26  , less than 39 4  Patients at this level are likely to benefit from discharge to post-acute rehabilitation services  Please refer to the recommendation of the Occupational Therapist for safe discharge planning  OT Discharge Recommendation: Post acute rehabilitation services       3/30/2023 1253 by Christal Carlos OT  Note: Limitation: Decreased ADL status, Decreased UE strength, Decreased Safe judgement during ADL, Decreased cognition, Decreased endurance, Decreased self-care trans, Decreased high-level ADLs  Prognosis: Good  Assessment: Pt is a 80 y o  male seen for OT evaluation s/p admit to B on 3/29/2023 w/ <principal problem not specified>  Pt presents to ED s/p fall with + head strike, pt found to have acute BL SDH and SAH with R>L  Comorbidities affecting pt's functional performance at time of assessment include: Parkinson disease, CVA, fall from standing  Personal factors affecting pt at time of IE include:difficulty performing ADLS, difficulty performing IADLS , limited insight into deficits, flat affect, decreased initiation and engagement  and health management   Prior to admission, pt was requiring assist with ADLS and IADLS  Upon evaluation: Pt presents supine and is agreeable to OTIE  All vitals WNL  Pt requires overall Mod A 2* the following deficits impacting occupational performance: weakness, decreased strength, decreased balance, decreased tolerance, impaired problem solving, decreased safety awareness and decreased coping skills   Pt resting in chair at end of session with all needs in reach, alarm on, all lines in place and SCD's on  Pt to benefit from continued skilled OT tx while in the hospital to address deficits as defined above and maximize level of functional independence w ADL's and functional mobility  Occupational Performance areas to address include: grooming, bathing/shower, toilet hygiene, dressing, health maintenance, functional mobility, community mobility, clothing management and social participation  The patient's raw score on the AM-PAC Daily Activity inpatient short form is 17  , standardized score is 37 26  , less than 39 4  Patients at this level are likely to benefit from discharge to post-acute rehabilitation services  Please refer to the recommendation of the Occupational Therapist for safe discharge planning       OT Discharge Recommendation: Post acute rehabilitation services

## 2023-03-30 NOTE — CASE MANAGEMENT
Case Management Assessment & Discharge Planning Note    Patient name Sylvester Tucker  Location ICU 03/ICU 52 MRN [id number]  : 1933 Date 3/30/2023       Current Admission Date: 3/29/2023  Current Admission Diagnosis:Fall from standing   Patient Active Problem List    Diagnosis Date Noted   • Fall from standing 2023   • SDH (subdural hematoma) 2023   • SAH (subarachnoid hemorrhage) ([facility] [address] ) 2023      LOS (days): 1  Geometric Mean LOS (GMLOS) (days): 2 90  Days to GMLOS:2 3     OBJECTIVE:    Risk of Unplanned Readmission Score: 11 21         Current admission status: Inpatient       Preferred Pharmacy:   [facility], [address] [place] [address]  Phone: [phone] Fax: [phone]    Primary Care Provider: Myriam Castillo DO    Primary Insurance: [facility]  Secondary Insurance:     ASSESSMENT:  Selin Chadwick Proxies    There are no active Health Care Proxies on file  Advance Directives  Does patient have a [address]?: No  Was patient offered paperwork?: Yes  Does patient currently have a Health Care decision maker?: Yes, please see Health Care Proxy section  Does patient have Advance Directives?: No  Was patient offered paperwork?: Yes  Primary Contact: Freda Baires (Spouse) [phone]    Readmission Root Cause  30 Day Readmission: No    Patient Information  Admitted from[de-identified] Home  Mental Status: Alert  During Assessment patient was accompanied by: Not accompanied during assessment  Assessment information provided by[de-identified] Spouse  Primary Caregiver: Self  Support Systems: Self, Spouse/significant other, Son, Family members  [place] of Residence: [address] do you live in?: [address] entry access options   Select all that apply : No steps to enter home  Type of Current Residence: 2 story home  Upon entering residence, is there a bedroom on the main floor (no further steps)?: No  A bedroom is located on the following floor levels of residence (select all that apply):: 2nd Floor  Upon entering residence, is there a bathroom on the main floor (no further steps)?: Yes  Number of steps to 2nd floor from main floor: One Flight (Stair glide)  In the last 12 months, was there a time when you were not able to pay the mortgage or rent on time?: No  In the last 12 months, how many places have you lived?: 1  In the last 12 months, was there a time when you did not have a steady place to sleep or slept in a shelter (including now)?: No  Homeless/housing insecurity resource given?: N/A  Living Arrangements: Lives w/ Spouse/significant other, Lives w/ Son  Is patient a ?: No    Activities of Daily Living Prior to Admission  Functional Status: Assistance  Completes ADLs independently?: No  Level of ADL dependence: Assistance  Ambulates independently?: Yes  Does patient use assisted devices?: Yes  Assisted Devices (DME) used: Straight Cane, Stair Chair/Glide, Walker, Shower Chair  Does patient currently own DME?: Yes  What DME does the patient currently own?: Shower Chair, Stair Chair/Glide, Straight Cane, Walker  Does patient have a history of Outpatient Therapy (PT/OT)?: Yes  Does the patient have a history of Short-Term Rehab?: Yes  Does patient have a history of HHC?: Yes  Does patient currently have [address]?: No    Patient Information Continued  Income Source: Pension/half-way  Does patient have prescription coverage?: Yes  Within the past 12 months, you worried that your food would run out before you got the money to buy more : Never true  Within the past 12 months, the food you bought just didn't last and you didn't have money to get more : Never true  Food insecurity resource given?: N/A  Does patient receive dialysis treatments?: No  Does patient have a history of substance abuse?: No  Does patient have a history of Mental Health Diagnosis?: No    Means of Transportation  Means of Transport to \[facility]\""[de-identified] Family transport  In the past 12 months, has lack of transportation kept you from medical appointments or from getting medications?: No  In the past 12 months, has lack of transportation kept you from meetings, work, or from getting things needed for daily living?: No  Was application for public transport provided?: N/A    DISCHARGE DETAILS:    Discharge planning discussed with[de-identified] Ivan Conroy (Spouse) [phone]  Freedom of Choice: Yes     CM contacted family/caregiver?: Yes  Were Treatment Team discharge recommendations reviewed with patient/caregiver?: Yes  Did patient/caregiver verbalize understanding of patient care needs?: N/A- going to facility  Were patient/caregiver advised of the risks associated with not following Treatment Team discharge recommendations?: Yes    Contacts  Patient Contacts: Ivan Conroy (Spouse) [phone]  Relationship to Patient[de-identified] Family  Contact Method: Phone  Phone Number: [phone]  Reason/Outcome: Continuity of Care, Emergency Contact, Discharge Planning      CM spoke to pt's wife to discuss the role of CM  Pt lives with his wife and their adult son, in a 2 story home which has 0STE and a stair glide inside  Pt has a 1st floor 1/2 bath (Standard toilet with bars)  Pt's bedroom and bathroom (walk-in shower with grab bars, shower chair, and standard toilet with bars)  Pt doesn't drive  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  Pt requires assistance for most ADL/iADLs  Pt's has 2-3 falls  Pt ambulates with an SPC but also owns a walker, and shower chair  Pt enjoys TV, playing on computer and listening to audio books  Pt has a living will  Pt uses [facility] in [place]  Pt has no hx of mental health or substance abuse  Pt's been to IP rehab in the past, as well as using [facility]  CM will appreciate therapy recommendations when appropriate     Pt has COVID vaccinations:    Dose 1  01/19/2021 (COVID-19 MODERNA VACC 0 5 ML IM)      Dose 2  02/15/2021 (COVID-19 MODERNA VACC 0 5 ML IM)      Booster dose  10/27/2021 (COVID-19 MODERNA VACC 0 5 ML IM)       CM reviewed d/c planning process including the following: identifying help at home, patient preference for d/c planning needs, Discharge Lounge, [facility] Meds to Bed program, availability of treatment team to discuss questions or concerns patient and/or family may have regarding understanding medications and recognizing signs and symptoms once discharged  CM also encouraged patient to follow up with all recommended appointments after discharge  Patient advised of importance for patient and family to participate in managing patient’s medical well being
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
## 2023-03-31 PROBLEM — G20 PARKINSON'S DISEASE (HCC): Status: ACTIVE | Noted: 2023-03-31

## 2023-03-31 PROBLEM — I48.91 ATRIAL FIBRILLATION (HCC): Status: ACTIVE | Noted: 2023-03-31

## 2023-03-31 LAB
ANION GAP SERPL CALCULATED.3IONS-SCNC: 3 MMOL/L (ref 4–13)
BUN SERPL-MCNC: 17 MG/DL (ref 5–25)
CALCIUM SERPL-MCNC: 9.4 MG/DL (ref 8.3–10.1)
CHLORIDE SERPL-SCNC: 102 MMOL/L (ref 96–108)
CO2 SERPL-SCNC: 30 MMOL/L (ref 21–32)
CREAT SERPL-MCNC: 0.77 MG/DL (ref 0.6–1.3)
ERYTHROCYTE [DISTWIDTH] IN BLOOD BY AUTOMATED COUNT: 13.1 % (ref 11.6–15.1)
GFR SERPL CREATININE-BSD FRML MDRD: 80 ML/MIN/1.73SQ M
GLUCOSE SERPL-MCNC: 124 MG/DL (ref 65–140)
HCT VFR BLD AUTO: 43.9 % (ref 36.5–49.3)
HGB BLD-MCNC: 14.6 G/DL (ref 12–17)
INR PPP: 1.12 (ref 0.84–1.19)
MCH RBC QN AUTO: 30.7 PG (ref 26.8–34.3)
MCHC RBC AUTO-ENTMCNC: 33.3 G/DL (ref 31.4–37.4)
MCV RBC AUTO: 92 FL (ref 82–98)
PLATELET # BLD AUTO: 158 THOUSANDS/UL (ref 149–390)
PMV BLD AUTO: 8.9 FL (ref 8.9–12.7)
POTASSIUM SERPL-SCNC: 3.8 MMOL/L (ref 3.5–5.3)
PROTHROMBIN TIME: 14.6 SECONDS (ref 11.6–14.5)
RBC # BLD AUTO: 4.75 MILLION/UL (ref 3.88–5.62)
SODIUM SERPL-SCNC: 135 MMOL/L (ref 135–147)
WBC # BLD AUTO: 8.28 THOUSAND/UL (ref 4.31–10.16)

## 2023-03-31 RX ORDER — LEVETIRACETAM 500 MG/1
500 TABLET ORAL EVERY 12 HOURS SCHEDULED
Status: DISCONTINUED | OUTPATIENT
Start: 2023-03-31 | End: 2023-04-03 | Stop reason: HOSPADM

## 2023-03-31 RX ADMIN — SENNOSIDES AND DOCUSATE SODIUM 2 TABLET: 8.6; 5 TABLET ORAL at 08:03

## 2023-03-31 RX ADMIN — CHLORHEXIDINE GLUCONATE 0.12% ORAL RINSE 15 ML: 1.2 LIQUID ORAL at 08:02

## 2023-03-31 RX ADMIN — PANTOPRAZOLE SODIUM 20 MG: 20 TABLET, DELAYED RELEASE ORAL at 06:10

## 2023-03-31 RX ADMIN — CARBIDOPA AND LEVODOPA 1 TABLET: 10; 100 TABLET ORAL at 21:04

## 2023-03-31 RX ADMIN — ACETAMINOPHEN 650 MG: 325 TABLET ORAL at 17:00

## 2023-03-31 RX ADMIN — CARVEDILOL 6.25 MG: 6.25 TABLET, FILM COATED ORAL at 08:02

## 2023-03-31 RX ADMIN — HEPARIN SODIUM 5000 UNITS: 5000 INJECTION INTRAVENOUS; SUBCUTANEOUS at 21:04

## 2023-03-31 RX ADMIN — LIDOCAINE 1 PATCH: 50 PATCH TOPICAL at 10:54

## 2023-03-31 RX ADMIN — CARVEDILOL 6.25 MG: 6.25 TABLET, FILM COATED ORAL at 17:01

## 2023-03-31 RX ADMIN — SENNOSIDES AND DOCUSATE SODIUM 2 TABLET: 8.6; 5 TABLET ORAL at 17:01

## 2023-03-31 RX ADMIN — DULOXETINE HYDROCHLORIDE 30 MG: 30 CAPSULE, DELAYED RELEASE ORAL at 08:02

## 2023-03-31 RX ADMIN — ACETAMINOPHEN 650 MG: 325 TABLET ORAL at 10:54

## 2023-03-31 RX ADMIN — CARBIDOPA AND LEVODOPA 1 TABLET: 10; 100 TABLET ORAL at 17:01

## 2023-03-31 RX ADMIN — LEVETIRACETAM 500 MG: 100 INJECTION, SOLUTION INTRAVENOUS at 08:12

## 2023-03-31 RX ADMIN — CARBIDOPA AND LEVODOPA 1 TABLET: 10; 100 TABLET ORAL at 08:13

## 2023-03-31 RX ADMIN — HEPARIN SODIUM 5000 UNITS: 5000 INJECTION INTRAVENOUS; SUBCUTANEOUS at 12:50

## 2023-03-31 RX ADMIN — ACETAMINOPHEN 650 MG: 325 TABLET ORAL at 01:27

## 2023-03-31 RX ADMIN — LEVETIRACETAM 500 MG: 500 TABLET, FILM COATED ORAL at 21:04

## 2023-03-31 RX ADMIN — HEPARIN SODIUM 5000 UNITS: 5000 INJECTION INTRAVENOUS; SUBCUTANEOUS at 06:14

## 2023-03-31 RX ADMIN — ATORVASTATIN CALCIUM 40 MG: 40 TABLET, FILM COATED ORAL at 08:02
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
## 2023-03-31 NOTE — ASSESSMENT & PLAN NOTE
- Status post unwitnessed fall with the below noted injuries  - Fall precautions  - Geriatric Medicine consultation for evaluation, medication review and recommendations   - PT and OT evaluation and treatment as indicated  - Case Management consultation for disposition planning

## 2023-03-31 NOTE — DISCHARGE INSTR - OTHER ORDERS
Cleanse L elbow with NSS, pat dry, and apply dermagran to the wound bed  Cover with 4x4 gauze and secure the dressings with ailyn wrap  Change every other day and as needed for soilage/dislodgement

## 2023-03-31 NOTE — PLAN OF CARE
Problem: PHYSICAL THERAPY ADULT  Goal: Performs mobility at highest level of function for planned discharge setting  See evaluation for individualized goals  Description: Treatment/Interventions: OT, Spoke to nursing, Spoke to case management, Gait training, Bed mobility, Patient/family training, Endurance training, LE strengthening/ROM, Functional transfer training          See flowsheet documentation for full assessment, interventions and recommendations  Outcome: Progressing  Note: Prognosis: Good  Problem List: Decreased strength, Decreased range of motion, Decreased endurance, Impaired balance, Decreased mobility, Decreased coordination, Decreased cognition, Impaired judgement, Decreased safety awareness, Pain  Assessment: Pt demonstrated overall improved mobility & functional endurance today, but continues to require Ax1 for all tasks due to impaired stength, balance, endurance & decreased insight to deficits  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, but demosntrates lack of insight to deficits & signs of fatigue until prompted to sit  pt recovered with seated rests & ambulated back to room in similar fashion  Recommend use of RW at d/c until balance & endurance deficits improve  Discussion held with spouse during session regarding his current function & baseline mobiilty  PT will continue to recommend rehab at this time to address deficits & ensure return home with decreased burden on family, but if family can provide requisite assist for all mobiilty given spouse's background working in PT as well and her apparent understanding of his condition & mobility status, he may be able to d/c home with support & follow up home PT  Pt has not attempted steps & was not appropraite today due to fatigue & increased complaints of pain as noted above at conclusion of gait training    Barriers to Discharge: Inaccessible home environment, Decreased caregiver support     PT Discharge Recommendation: Post acute rehabilitation services (vs home PT pending support, see assessment)    See flowsheet documentation for full assessment

## 2023-03-31 NOTE — PLAN OF CARE
Problem: MOBILITY - ADULT  Goal: Maintain or return to baseline ADL function  Description: INTERVENTIONS:  -  Assess patient's ability to carry out ADLs; assess patient's baseline for ADL function and identify physical deficits which impact ability to perform ADLs (bathing, care of mouth/teeth, toileting, grooming, dressing, etc )  - Assess/evaluate cause of self-care deficits   - Assess range of motion  - Assess patient's mobility; develop plan if impaired  - Assess patient's need for assistive devices and provide as appropriate  - Encourage maximum independence but intervene and supervise when necessary  - Involve family in performance of ADLs  - Assess for home care needs following discharge   - Consider OT consult to assist with ADL evaluation and planning for discharge  - Provide patient education as appropriate  Outcome: Progressing     Problem: INFECTION - ADULT  Goal: Absence or prevention of progression during hospitalization  Description: INTERVENTIONS:  - Assess and monitor for signs and symptoms of infection  - Monitor lab/diagnostic results  - Monitor all insertion sites, i e  indwelling lines, tubes, and drains  - Monitor endotracheal if appropriate and nasal secretions for changes in amount and color  - [place] appropriate cooling/warming therapies per order  - Administer medications as ordered  - Instruct and encourage patient and family to use good hand hygiene technique  - Identify and instruct in appropriate isolation precautions for identified infection/condition  Outcome: Progressing     Problem: PAIN - ADULT  Goal: Verbalizes/displays adequate comfort level or baseline comfort level  Description: Interventions:  - Encourage patient to monitor pain and request assistance  - Assess pain using appropriate pain scale  - Administer analgesics based on type and severity of pain and evaluate response  - Implement non-pharmacological measures as appropriate and evaluate response  - Consider cultural and social influences on pain and pain management  - Notify physician/advanced practitioner if interventions unsuccessful or patient reports new pain  Outcome: Progressing     Problem: SAFETY ADULT  Goal: Maintain or return to baseline ADL function  Description: INTERVENTIONS:  -  Assess patient's ability to carry out ADLs; assess patient's baseline for ADL function and identify physical deficits which impact ability to perform ADLs (bathing, care of mouth/teeth, toileting, grooming, dressing, etc )  - Assess/evaluate cause of self-care deficits   - Assess range of motion  - Assess patient's mobility; develop plan if impaired  - Assess patient's need for assistive devices and provide as appropriate  - Encourage maximum independence but intervene and supervise when necessary  Problem: Nutrition/Hydration-ADULT  Goal: Nutrient/Hydration intake appropriate for improving, restoring or maintaining nutritional needs  Description: Monitor and assess patient's nutrition/hydration status for malnutrition  Collaborate with interdisciplinary team and initiate plan and interventions as ordered  Monitor patient's weight and dietary intake as ordered or per policy  Utilize nutrition screening tool and intervene as necessary  Determine patient's food preferences and provide high-protein, high-caloric foods as appropriate       INTERVENTIONS:  - Monitor oral intake, urinary output, labs, and treatment plans  - Assess nutrition and hydration status and recommend course of action  - Evaluate amount of meals eaten  - Assist patient with eating if necessary   - Allow adequate time for meals  - Recommend/ encourage appropriate diets, oral nutritional supplements, and vitamin/mineral supplements  - Order, calculate, and assess calorie counts as needed  - Recommend, monitor, and adjust tube feedings and TPN/PPN based on assessed needs  - Assess need for intravenous fluids  - Provide specific nutrition/hydration education as appropriate  - Include patient/family/caregiver in decisions related to nutrition  Outcome: Progressing     - Involve family in performance of ADLs  - Assess for home care needs following discharge   - Consider OT consult to assist with ADL evaluation and planning for discharge  - Provide patient education as appropriate  Outcome: Progressing

## 2023-03-31 NOTE — PROGRESS NOTES
[facility]  Progress Note  Name: Ramonita Dyer  MRN: [id number]  Unit/Bed#: [facility] 535-02 I Date of Admission: 3/29/2023   Date of Service: 3/31/2023 I Hospital Day: 2    Assessment/Plan   Atrial fibrillation [facility])  Assessment & Plan  - Hold home Coumadin in setting of SDH  - Carvedilol for rate control    Parkinson's disease ([facility] [address] )  Assessment & Plan  - Continue home Sinemet    SDH (subdural hematoma)  Assessment & Plan  - secondary to fall at home  - bilateral  - coumadin reversed prior to transfer  - neurosurgery consult, appreciate recommendations  - DVT ppx heparin  - normal neuro exam  - Q4hr neurochecks  - Seizure ppx with Keppra x7 days  - PT/OT - recommend postacute rehab    Fall from standing  Assessment & Plan  - Status post unwitnessed fall with the below noted injuries  - Fall precautions  - Geriatric Medicine consultation for evaluation, medication review and recommendations   - PT and OT evaluation and treatment as indicated  - Case Management consultation for disposition planning  * SAH (subarachnoid hemorrhage) ([facility])  Assessment & Plan  - traumatic  - repeat [facility] 3/30 stable  -Per neurosurgery, will repeat CT head again on 4/1 for stability  -Q4hr neurochecks  -Started on DVT ppx heparin  -Seizure ppx with Keppra x7 days  -PT/OT             TRAUMA TERTIARY SURVEY NOTE    VTE Prophylaxis:Heparin     Disposition: Continue level 2 stepdown status, repeat CT head scheduled for tomorrow    Code status:  Level 3 - DNAR and DNI    Consultants: IP CONSULT TO GERONTOLOGY  IP CONSULT TO NEUROSURGERY  IP CONSULT TO CASE MANAGEMENT  IP CONSULT TO GERONTOLOGY    Subjective   Transfer from: [facility]    Mechanism of Injury:Fall     Chief Complaint: Back pain    HPI/Last 24 hour events: Patient states he is doing well this morning and has no new pain  He is having lumbosacral pain that is normal for him  He is anxious to go home, and was asking to be discharged today    He states his appetite is decreased and is not eating that much  He denies abdominal pain, nausea, vomiting  Objective   Vitals:   Temp:  [98 °F (36 7 °C)-98 7 °F (37 1 °C)] 98 4 °F (36 9 °C)  HR:  [60-71] 64  Resp:  [18-21] 18  BP: (106-139)/(56-78) 113/58    I/O       03/29 0701  03/30 0700 03/30 0701 03/31 0700 03/31 0701  04/01 0700    P  O  30 120 240    I V  (mL/kg) 30 (0 5) 30 (0 5)     IV Piggyback 100 200     Total Intake(mL/kg) 160 (2 5) 350 (5 4) 240 (3 7)    Urine (mL/kg/hr)  350 (0 2)     Stool  0     Total Output  350     Net +160 0 +240           Unmeasured Urine Occurrence 2 x 2 x 1 x    Unmeasured Stool Occurrence 1 x 1 x            Physical Exam:   GENERAL APPEARANCE: Patient in no acute distress  Resting comfortably in the chair  HEENT: NCAT; PERRL, EOMs intact; Mucous membranes moist  NECK / BACK: ROM normal  CV: Regular rate and rhythm; no murmur/gallops/rubs appreciated  CHEST / LUNGS: Clear to auscultation; no wheezes/rales/rhonci  ABD: NABS; soft; non-distended; non-tender  : voiding  EXT: +2 pulses bilaterally upper & lower extremities; no edema  NEURO: Resting tremor; GCS 15; no focal neurologic deficits; neurovascularly intact  SKIN: ecchymosis surrounding left eye; Warm, dry and well perfused; no rash; no jaundice  Invasive Devices     Peripheral Intravenous Line  Duration           Peripheral IV 03/30/23 Right Antecubital <1 day                   1  Before the illness or injury that brought you to the Emergency, did you need someone to help you on a regular basis? 0=No   2  Since the illness or injury that brought you to the Emergency, have you needed more help than usual to take care of yourself? 1=Yes   3  Have you been hospitalized for one or more nights during the past 6 months (excluding a stay in the Emergency Department)? 0=No   4  In general, do you see well? 0=Yes   5  In general, do you have serious problems with your memory? 1=Yes   6   Do you take more than three different medications everyday? 1=Yes   TOTAL   3     Did you order a geriatric consult if the score was 2 or greater?: yes         Lab Results:   Results: I have personally reviewed all pertinent laboratory/tests results, BMP/CMP:   Lab Results   Component Value Date    SODIUM 135 03/31/2023    K 3 8 03/31/2023     03/31/2023    CO2 30 03/31/2023    BUN 17 03/31/2023    CREATININE 0 77 03/31/2023    CALCIUM 9 4 03/31/2023    EGFR 80 03/31/2023   , CBC:   Lab Results   Component Value Date    WBC 8 28 03/31/2023    HGB 14 6 03/31/2023    HCT 43 9 03/31/2023    MCV 92 03/31/2023     03/31/2023    MCH 30 7 03/31/2023    MCHC 33 3 03/31/2023    RDW 13 1 03/31/2023    MPV 8 9 03/31/2023    and Coagulation:   Lab Results   Component Value Date    INR 1 12 03/31/2023       Imaging Results:   Chest Xray(s): N/A   FAST exam(s): N/A   CT Scan(s): see below   Additional Xray(s): negative for acute findings     Other Studies:   CT head wo contrast   Final Result by Ritu Ferrera MD (03/30 5126)      Intracranial hemorrhages, as described above, without significant change compared to yesterday  Please see discussion  The images are available for clinical review                    Workstation performed: [id number]         CT head wo contrast    (Results Pending)   CT head wo contrast    (Results Pending)

## 2023-03-31 NOTE — PHYSICAL THERAPY NOTE
"Physical Therapy Progress Note     03/31/23 1050   PT Last Visit   PT Visit Date 03/31/23   Note Type   Note Type Treatment   Pain Assessment   Pain Assessment Tool FLACC   Pain Rating: FLACC (Rest) - Face 1   Pain Rating: FLACC (Rest) - Legs 1   Pain Rating: FLACC (Rest) - Activity 1   Pain Rating: FLACC (Rest) - Cry 1   Pain Rating: FLACC (Rest) - Consolability 1   Score: FLACC (Rest) 5   Pain Rating: FLACC (Activity) - Face 1   Pain Rating: FLACC (Activity) - Legs 1   Pain Rating: FLACC (Activity) - Activity 1   Pain Rating: FLACC (Activity) - Cry 1   Pain Rating: FLACC (Activity) - Consolability 1   Score: FLACC (Activity) 5   Restrictions/Precautions   Other Precautions Cognitive; Chair Alarm; Bed Alarm; Impulsive;Hard of hearing; Fall Risk;Pain   Subjective   Subjective pt encountered sitting EOB with alarm sounding & no staff present  He was initilly assisted back into supine, but then MENESES arrived, so formal session initiated  Pt reports increased back & R hip pain \"it's right over my ischial tuberosity  \"  Also endorses headache when it was observed that he began holding his head more at conclusion of session  He occasionally requested to take out IV site in R antecubit, and had to be distracted with task to prevent picking at it  RN informed of the same post session  Pt is [place] and easily distracted by conversation, and can be difficult to redirect to task when he is intent on completing thought or joke  Spouse arrived & reports he remains below usual baseline, but feels family can care for him at d/c  Trauma team informed of her request to discuss his medical status at conclusion of session  Bed Mobility   Supine to Sit 4  Minimal assistance   Additional items Assist x 1; Increased time required; Impulsive  (sat up at EOB prior to session without staff present)   Sit to Supine 4  Minimal assistance   Additional items Assist x 1; Increased time required   Transfers   Sit to Stand 4  Minimal assistance " Additional items Assist x 1; Armrests; Increased time required   Stand to Sit 4  Minimal assistance   Additional items Assist x 1; Armrests; Increased time required   Ambulation/Elevation   Gait pattern Short stride; Foward flexed; Inconsistent beatriz; Shuffling;Decreased foot clearance;Narrow HARDEEP; Improper Weight shift; Poor UE support   Gait Assistance 4  Minimal assist   Additional items Assist x 1  (+ chair follow)   Assistive Device Rolling walker;SPC   Distance 10' with SPC & mod A, 50', 61' with RW min a   Balance   Static Sitting Fair   Static Standing Poor +   Ambulatory Poor +   Activity Tolerance   Activity Tolerance Patient tolerated treatment well;Patient limited by pain; Patient limited by fatigue   Nurse Made Aware Dario Hernandez RN   Assessment   Prognosis Good   Problem List Decreased strength;Decreased range of motion;Decreased endurance; Impaired balance;Decreased mobility; Decreased coordination;Decreased cognition; Impaired judgement;Decreased safety awareness;Pain   Assessment Pt demonstrated overall improved mobility & functional endurance today, but continues to require Ax1 for all tasks due to impaired stength, balance, endurance & decreased insight to deficits  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, but demosntrates lack of insight to deficits & signs of fatigue until prompted to sit  pt recovered with seated rests & ambulated back to room in similar fashion  Recommend use of RW at d/c until balance & endurance deficits improve  Discussion held with spouse during session regarding his current function & baseline mobiilty    PT will continue to recommend rehab at this time to address deficits & ensure return home with decreased burden on family, but if family can provide requisite assist for all mobiilty given spouse's background working in PT as well and her apparent understanding of his condition & mobility status, he may be able to d/c home with support & follow up home PT  Pt has not attempted steps & was not appropraite today due to fatigue & increased complaints of pain as noted above at conclusion of gait training  Goals   Patient Goals to go home today   STG Expiration Date 04/11/23   PT Treatment Day 1   Plan   Treatment/Interventions Functional transfer training;LE strengthening/ROM; Elevations; Therapeutic exercise; Endurance training;Patient/family training;Equipment eval/education;Gait training;Bed mobility   Progress Progressing toward goals   PT Frequency 3-5x/wk   Recommendation   PT Discharge Recommendation Post acute rehabilitation services  (vs home PT pending support, see assessment)   Equipment Recommended [facility] Recommended Wheeled walker   AM-PAC Basic Mobility Inpatient   Turning in Flat Bed Without Bedrails 4   Lying on Back to Sitting on Edge of Flat Bed Without Bedrails 3   Moving Bed to Chair 3   Standing Up From Chair Using Arms 3   Walk in Room 3   Climb 3-5 Stairs With Railing 2   Basic Mobility Inpatient Raw Score 18   Basic Mobility Standardized Score 41 05   Highest Level Of Mobility   JH-HLM Goal 6: Walk 10 steps or more   JH-HLM Achieved 7: Walk 25 feet or more     [facility], PTA    An [facility] Basic Mobility Raw Score less than 17 suggests pt would benefit from post acute rehab  Please also refer to the recommendation of the Physical Therapist for safe discharge planning

## 2023-03-31 NOTE — CONSULTS
Consultation - [address] [facility] Andrew 80 y o  male MRN: [id number]  Unit/Bed#: [facility] 606-01 Encounter: [id number]    Assessment:  Skin tear of the left elbow without complication, initial encounter    Plan:  • Skin tear to the left elbow: will recommend Dermagran gauze and dry dressing to the wound every other day  • No s/s of infection present  • Will recommend preventative nursing skin care measures  • Pressure relief  • Patient verbalized understanding of plan of care  • Wound care will sign off, please reconsult if needed    History of Present Illness:   Patient is an 35-year-old male who was admitted with a subarachnoid hemorrhage status post fall  Patient has a history of Parkinson's and atrial fibrillation  Wound care consulted for skin tear to the left elbow  Patient seen in bed, alert and oriented with forgetfulness,  Cooperative and agreeable for the assessment  Patient states he sustained a skin tear to his left elbow from his fall  Moderate assist of 1 with turning  No incontinence appreciated at time of the assessment  Wound open to air at time of the assessment  Denies fever, chills, or increased pain related to the wound  Subjective:    Review of Systems   Constitutional: Negative for chills and fever  HENT: Negative for congestion and sneezing  Respiratory: Negative for cough and shortness of breath  Musculoskeletal: Positive for gait problem  Skin: Positive for wound  L elbow    Neurological: Positive for tremors  Psychiatric/Behavioral: Negative for agitation         Historical Information   Past Medical History:   Diagnosis Date   • Parkinson disease ([facility] [address] )      Past Surgical History:   Procedure Laterality Date   • VASECTOMY       Social History   Social History     Substance and Sexual Activity   Alcohol Use Not Currently     Social History     Substance and Sexual Activity   Drug Use Never     E-Cigarette/Vaping   • E-Cigarette Use Never User "    E-Cigarette/Vaping Substances     Social History     Tobacco Use   Smoking Status Former   Smokeless Tobacco Never     Family History:   Family History   Problem Relation Age of Onset   • Heart disease Father    • COPD Mother        Meds/Allergies   current meds:   Current Facility-Administered Medications   Medication Dose Route Frequency   • acetaminophen (TYLENOL) tablet 650 mg  650 mg Oral Q6H PRN   • atorvastatin (LIPITOR) tablet 40 mg  40 mg Oral Daily   • carbidopa-levodopa (SINEMET)  mg per tablet 1 tablet  1 tablet Oral TID   • carvedilol (COREG) tablet 6 25 mg  6 25 mg Oral BID With Meals   • chlorhexidine (PERIDEX) 0 12 % oral rinse 15 mL  15 mL Mouth/Throat Q12H [address]   • DULoxetine (CYMBALTA) delayed release capsule 30 mg  30 mg Oral Daily   • glycerin-hypromellose- (ARTIFICIAL TEARS) ophthalmic solution 1 drop  1 drop Both Eyes Q4H PRN   • heparin (porcine) subcutaneous injection 5,000 Units  5,000 Units Subcutaneous Q8H [address]   • levETIRAcetam (KEPPRA) 500 mg in sodium chloride 0 9 % 100 mL IVPB  500 mg Intravenous Q12H [address]   • lidocaine (LIDODERM) 5 % patch 1 patch  1 patch Topical Daily PRN   • ondansetron (ZOFRAN) injection 4 mg  4 mg Intravenous Q4H PRN   • pantoprazole (PROTONIX) EC tablet 20 mg  20 mg Oral Early Morning   • senna-docusate sodium (SENOKOT S) 8 6-50 mg per tablet 2 tablet  2 tablet Oral BID     Allergies   Allergen Reactions   • Penicillins Rash       Objective   Vitals: Blood pressure 113/58, pulse 64, temperature 98 4 °F (36 9 °C), resp  rate 18, height 5' 10\" (1 778 m), weight 64 8 kg (142 lb 13 7 oz), SpO2 97 %       Wounds:     Wound 03/30/23 Elbow Left;Posterior (Active)   Wound Image       03/31/23 0921   Wound Length (cm) 2 cm 03/31/23 0921   Wound Width (cm) 2 cm 03/31/23 0921   Wound Depth (cm) 0 1 cm 03/31/23 0921   Wound Surface Area (cm^2) 4 cm^2 03/31/23 0921   Wound Volume (cm^3) 0 4 cm^3 03/31/23 0921   Calculated Wound Volume (cm^3) 0 4 cm^3 03/31/23 " 0830       Physical Exam  Constitutional:       General: He is awake  He is not in acute distress  Appearance: He is not ill-appearing, toxic-appearing or diaphoretic  HENT:      Head: Normocephalic  Right Ear: External ear normal       Left Ear: External ear normal    Eyes:      Conjunctiva/sclera: Conjunctivae normal    Pulmonary:      Effort: Pulmonary effort is normal  No respiratory distress  Skin:     General: Skin is warm and dry  Findings: Ecchymosis and wound present  No erythema or rash  Comments: 1  Type III skin tear to the left elbow-wound is partial-thickness, 100% moist pink/red tissue  No skin flap to approximate  Edges fragile intact without maceration  Marta-wound is intact with ecchymosis  No drainage noted  2  B/l heels, sacrum and buttocks are intact without redness or wounds  No maceration noted in the sacral intergluteal crease  No induration, fluctuance, odor, warmth/temperature differences, redness, or purulence noted to the above mentioned wounds and skin areas assessed  New dressings applied as noted above  Patient tolerated assessment well- denies pain and no s/s of non-verbal pain or discomfort observed during the encounter  Neurological:      Mental Status: He is alert and oriented to person, place, and time  Motor: Tremor present  Gait: Gait abnormal    Psychiatric:         Mood and Affect: Mood normal          Behavior: Behavior normal  Behavior is cooperative  Lab, Imaging and other studies: I have personally reviewed pertinent reports  Code Status: Level 3 - DNAR and DNI      Counseling / Coordination of Care  Total time spent today:    Total time (face-to-face and non-face-to-face) spent on today's visit was 20 minutes  This includes preparation for the visits (H&P on 3/29/23) performance of a medically appropriate history and examination, and orders for medications/treatments or testing    Discussed assessment "findings, and plan of care/recommendations with patients YVONNE Brennanor, CRNP, CWON      Portions of the record may have been created with voice recognition software  Occasional wrong word or \"sound a like\" substitutions may have occurred due to the inherent limitations of voice recognition software    Read the chart carefully and recognize, using context, where substitutions have occurred      "

## 2023-03-31 NOTE — ASSESSMENT & PLAN NOTE
- secondary to fall at home  - bilateral  - coumadin reversed prior to transfer  - neurosurgery consult, appreciate recommendations  - DVT ppx heparin  - normal neuro exam  - Q4hr neurochecks  - Seizure ppx with Keppra x7 days  - PT/OT - recommend postacute rehab

## 2023-03-31 NOTE — CASE MANAGEMENT
Case Management Discharge Planning Note    Patient name Yoselin Soriano  Location [address] 606/[facility]HP 306-26 MRN [id number]  : 1933 Date 3/31/2023       Current Admission Date: 3/29/2023  Current Admission Diagnosis:SAH (subarachnoid hemorrhage) [facility])   Patient Active Problem List    Diagnosis Date Noted   • Parkinson's disease ([facility] [address] ) 2023   • Atrial fibrillation ([facility] [address] ) 2023   • Fall from standing 2023   • SDH (subdural hematoma) 2023   • SAH (subarachnoid hemorrhage) ([facility] [address] ) 2023      LOS (days): 2  Geometric Mean LOS (GMLOS) (days): 2 90  Days to GMLOS:1 3     OBJECTIVE:  Risk of Unplanned Readmission Score: 10 03         Current admission status: Inpatient   Preferred Pharmacy:   [facility] [address] [address]  Phone: [phone] Fax: [phone]    Primary Care Provider: Alexandr Larsen DO    Primary Insurance: Shaneka Chaudhry [facility]  Secondary Insurance:     DISCHARGE DETAILS:    CM spoke to pt's wife  She was notified that pt was recommended for IP rehab but pt's wife prefers a home d/c with [facility]  CM placed referrals  CM will follow up

## 2023-03-31 NOTE — PLAN OF CARE
Problem: OCCUPATIONAL THERAPY ADULT  Goal: Performs self-care activities at highest level of function for planned discharge setting  See evaluation for individualized goals  Description: Treatment Interventions: ADL retraining, Functional transfer training, UE strengthening/ROM, Cognitive reorientation, Endurance training, Patient/family training, Neuromuscular reeducation, UE splinting, Continued evaluation, Energy conservation, Activityengagement          See flowsheet documentation for full assessment, interventions and recommendations  Outcome: Progressing  Note: Limitation: Decreased ADL status, Decreased UE strength, Decreased Safe judgement during ADL, Decreased cognition, Decreased endurance, Decreased self-care trans, Decreased high-level ADLs  Prognosis: Good  Assessment: pt participated in am ot session and was seen focusing on dressing tasks for ub/lb  pt reports lightheadedness with functional mobility, needing to be switched to rw not spc  pt with noted minimal sob with moderate activity  pt required mod asst lb adls and min asst ub  pt's wife arrived and reports she deldomly has to assist pt with dressing since cva in 2019  pt required min to mod asst at times with use of rw for functional mobility  pt with noted deficit in recall of information, asking names and roles of therapsit again and again  feel pt would benifit from in pt rehab, currently he needs asst and s for all tasks ie toielting adls functional mobiltiu iadls   question if pts wife can provide this amt of asst  will follow focusing on goals from ie     OT Discharge Recommendation: Post acute rehabilitation services        April A Storm

## 2023-03-31 NOTE — PLAN OF CARE
Problem: MOBILITY - ADULT  Goal: Maintain or return to baseline ADL function  Description: INTERVENTIONS:  -  Assess patient's ability to carry out ADLs; assess patient's baseline for ADL function and identify physical deficits which impact ability to perform ADLs (bathing, care of mouth/teeth, toileting, grooming, dressing, etc )  - Assess/evaluate cause of self-care deficits   - Assess range of motion  - Assess patient's mobility; develop plan if impaired  - Assess patient's need for assistive devices and provide as appropriate  - Encourage maximum independence but intervene and supervise when necessary  - Involve family in performance of ADLs  - Assess for home care needs following discharge   - Consider OT consult to assist with ADL evaluation and planning for discharge  - Provide patient education as appropriate  Outcome: Progressing  Goal: Maintains/Returns to pre admission functional level  Description: INTERVENTIONS:  - Perform BMAT or MOVE assessment daily    - Set and communicate daily mobility goal to care team and patient/family/caregiver  - Collaborate with rehabilitation services on mobility goals if consulted  - Perform Range of Motion 3 times a day  - Reposition patient every 2 hours    - Dangle patient 3 times a day  - Stand patient 3 times a day  - Ambulate patient 3 times a day  - Out of bed to chair 3 times a day   - Out of bed for meals 3 times a day  - Out of bed for toileting  - Record patient progress and toleration of activity level   Outcome: Progressing     Problem: PAIN - ADULT  Goal: Verbalizes/displays adequate comfort level or baseline comfort level  Description: Interventions:  - Encourage patient to monitor pain and request assistance  - Assess pain using appropriate pain scale  - Administer analgesics based on type and severity of pain and evaluate response  - Implement non-pharmacological measures as appropriate and evaluate response  - Consider cultural and social influences on pain and pain management  - Notify physician/advanced practitioner if interventions unsuccessful or patient reports new pain  Outcome: Progressing     Problem: INFECTION - ADULT  Goal: Absence or prevention of progression during hospitalization  Description: INTERVENTIONS:  - Assess and monitor for signs and symptoms of infection  - Monitor lab/diagnostic results  - Monitor all insertion sites, i e  indwelling lines, tubes, and drains  - Monitor endotracheal if appropriate and nasal secretions for changes in amount and color  - [place] appropriate cooling/warming therapies per order  - Administer medications as ordered  - Instruct and encourage patient and family to use good hand hygiene technique  - Identify and instruct in appropriate isolation precautions for identified infection/condition  Outcome: Progressing  Goal: Absence of fever/infection during neutropenic period  Description: INTERVENTIONS:  - Monitor WBC    Outcome: Progressing     Problem: SAFETY ADULT  Goal: Maintain or return to baseline ADL function  Description: INTERVENTIONS:  -  Assess patient's ability to carry out ADLs; assess patient's baseline for ADL function and identify physical deficits which impact ability to perform ADLs (bathing, care of mouth/teeth, toileting, grooming, dressing, etc )  - Assess/evaluate cause of self-care deficits   - Assess range of motion  - Assess patient's mobility; develop plan if impaired  - Assess patient's need for assistive devices and provide as appropriate  - Encourage maximum independence but intervene and supervise when necessary  - Involve family in performance of ADLs  - Assess for home care needs following discharge   - Consider OT consult to assist with ADL evaluation and planning for discharge  - Provide patient education as appropriate  Outcome: Progressing  Goal: Maintains/Returns to pre admission functional level  Description: INTERVENTIONS:  - Perform BMAT or MOVE assessment daily    - Set and communicate daily mobility goal to care team and patient/family/caregiver  - Collaborate with rehabilitation services on mobility goals if consulted  - Perform Range of Motion 3 times a day  - Reposition patient every 2 hours    - Dangle patient 3 times a day  - Stand patient 3 times a day  - Ambulate patient 3 times a day  - Out of bed to chair 3 times a day   - Out of bed for meals 3 times a day  - Out of bed for toileting  - Record patient progress and toleration of activity level   Outcome: Progressing  Goal: Patient will remain free of falls  Description: INTERVENTIONS:  - Educate patient/family on patient safety including physical limitations  - Instruct patient to call for assistance with activity   - Consult OT/PT to assist with strengthening/mobility   - Keep Call bell within reach  - Keep bed low and locked with side rails adjusted as appropriate  - Keep care items and personal belongings within reach  - Initiate and maintain comfort rounds  - Make Fall Risk Sign visible to staff  - Offer Toileting every 2 Hours, in advance of need  - Initiate/Maintain alarm  - Obtain necessary fall risk management equipment:   - Apply yellow socks and bracelet for high fall risk patients  - Consider moving patient to room near nurses station  Outcome: Progressing     Problem: DISCHARGE PLANNING  Goal: Discharge to home or other facility with appropriate resources  Description: INTERVENTIONS:  - Identify barriers to discharge w/patient and caregiver  - Arrange for needed discharge resources and transportation as appropriate  - Identify discharge learning needs (meds, wound care, etc )  - Arrange for interpretive services to assist at discharge as needed  - Refer to Case Management Department for coordinating discharge planning if the patient needs post-hospital services based on physician/advanced practitioner order or complex needs related to functional status, cognitive ability, or social support system  Outcome: Progressing     Problem: Knowledge Deficit  Goal: Patient/family/caregiver demonstrates understanding of disease process, treatment plan, medications, and discharge instructions  Description: Complete learning assessment and assess knowledge base  Interventions:  - Provide teaching at level of understanding  - Provide teaching via preferred learning methods  Outcome: Progressing     Problem: Nutrition/Hydration-ADULT  Goal: Nutrient/Hydration intake appropriate for improving, restoring or maintaining nutritional needs  Description: Monitor and assess patient's nutrition/hydration status for malnutrition  Collaborate with interdisciplinary team and initiate plan and interventions as ordered  Monitor patient's weight and dietary intake as ordered or per policy  Utilize nutrition screening tool and intervene as necessary  Determine patient's food preferences and provide high-protein, high-caloric foods as appropriate       INTERVENTIONS:  - Monitor oral intake, urinary output, labs, and treatment plans  - Assess nutrition and hydration status and recommend course of action  - Evaluate amount of meals eaten  - Assist patient with eating if necessary   - Allow adequate time for meals  - Recommend/ encourage appropriate diets, oral nutritional supplements, and vitamin/mineral supplements  - Order, calculate, and assess calorie counts as needed  - Recommend, monitor, and adjust tube feedings and TPN/PPN based on assessed needs  - Assess need for intravenous fluids  - Provide specific nutrition/hydration education as appropriate  - Include patient/family/caregiver in decisions related to nutrition  Outcome: Progressing

## 2023-03-31 NOTE — OCCUPATIONAL THERAPY NOTE
Occupational Therapy Treatment Note:      03/31/23 1055   OT Last Visit   OT Visit Date 03/31/23   Note Type   Note Type Treatment   ADL   Where Assessed Edge of bed   Grooming Assistance 5  Supervision/Setup   LB Dressing Assistance 3  Moderate Assistance   LB Dressing Comments seated eob to [facility] pants and socks  pt noted to lift legs for ot to thread legs  pt also required asst to balance and pull over hips   Functional Standing Tolerance   Time fair balance with rw, at times becomes light headed   Bed Mobility   Supine to Sit 5  Supervision   Sit to Supine 5  Supervision   Transfers   Sit to Stand 4  Minimal assistance   Stand to Sit 4  Minimal assistance   Functional Mobility   Functional Mobility 4  Minimal assistance   Additional items Rolling walker   Cognition   Overall Cognitive Status Impaired   Arousal/Participation Alert; Cooperative   Attention Attends with cues to redirect   Memory Decreased recall of precautions;Decreased recall of recent events;Decreased short term memory   Following Commands Follows one step commands without difficulty   Activity Tolerance   Activity Tolerance Patient tolerated treatment well   Assessment   Assessment pt participated in am ot session and was seen focusing on dressing tasks for ub/lb  pt reports lightheadedness with functional mobility, needing to be switched to rw not spc  pt with noted minimal sob with moderate activity  pt required mod asst lb adls and min asst ub  pt's wife arrived and reports she deldomly has to assist pt with dressing since cva in 2019  pt required min to mod asst at times with use of rw for functional mobility  pt with noted deficit in recall of information, asking names and roles of therapsit again and again  feel pt would benifit from in pt rehab, currently he needs asst and s for all tasks ie toielting adls functional mobiltiu iadls   question if pts wife can provide this amt of asst  will follow focusing on goals from ie   Plan Treatment Interventions ADL retraining;Functional transfer training; Endurance training;Patient/family training; Activityengagement   Goal Expiration Date 04/13/23   OT Treatment Day 1   OT Frequency 3-5x/wk   Recommendation   OT Discharge Recommendation Post acute rehabilitation services   AM-PAC Daily Activity Inpatient   Lower Body Dressing 2   Bathing 2   Toileting 2   Upper Body Dressing 3   Grooming 4   Eating 4   Daily Activity Raw Score 17   Daily Activity Standardized Score (Calc for Raw Score >=11) 37 26   AM-PAC Applied Cognition Inpatient   Following a Speech/Presentation 3   Understanding Ordinary Conversation 4   Taking Medications 4   Remembering Where Things Are Placed or Put Away 2   Remembering List of 4-5 Errands 2   Taking Care of Complicated Tasks 2   Applied Cognition Raw Score 17   Applied Cognition Standardized Score 36 52     April A Storm

## 2023-03-31 NOTE — CASE MANAGEMENT
Case Management Discharge Planning Note    Patient name Harinder Mike  Location [facility] 606/[facility] 313-05 MRN [id number]  : 1933 Date 3/31/2023       Current Admission Date: 3/29/2023  Current Admission Diagnosis:SAH (subarachnoid hemorrhage) [facility])   Patient Active Problem List    Diagnosis Date Noted   • Parkinson's disease ([facility] [address] ) 2023   • Atrial fibrillation ([facility] [address] ) 2023   • Fall from standing 2023   • SDH (subdural hematoma) 2023   • SAH (subarachnoid hemorrhage) ([facility] [address] ) 2023      LOS (days): 2  Geometric Mean LOS (GMLOS) (days): 2 90  Days to GMLOS:1 2     OBJECTIVE:  Risk of Unplanned Readmission Score: 10 03         Current admission status: Inpatient   Preferred Pharmacy:   [address] [place], [place] [address]  Phone: [phone] Fax: [phone]    Primary Care Provider: Shiloh Edwards DO    Primary Insurance: [facility]  Secondary Insurance:     DISCHARGE DETAILS:                                [address]         Is the patient interested in [facility] at discharge?: Yes  [address] requested[de-identified] Physical Therapy, Occupational Therapy, [address] Name[de-identified] [address] [facility] Provider[de-identified] PCP  Home Health Services Needed[de-identified] Evaluate Functional Status and Safety, Gait/ADL Training, Strengthening/Theraputic Exercises to Improve Function  Homebound Criteria Met[de-identified] Uses an Assist Device (i e  cane, walker, etc), Requires the Assistance of Another Person for Safe Ambulation or to Leave the Home  Supporting Clincal Findings[de-identified] Fatigues Easliy in [employer], Limited Endurance    DME Referral Provided  Referral made for DME?: No    Other Referral/Resources/Interventions Provided:  Interventions: [facility]    Treatment Team Recommendation: SNF, Short Term Rehab  Discharge Destination Plan[de-identified] Home with [address] met with pt's wife     She confirmed her desire for a home d/c tomorrow with [facility]  She would like [facility]     Plan to d/c home tomorrow with family transporting

## 2023-04-01 ENCOUNTER — APPOINTMENT (INPATIENT)
Dept: RADIOLOGY | Facility: HOSPITAL | Age: 88
End: 2023-04-01

## 2023-04-01 RX ORDER — GABAPENTIN 300 MG/1
300 CAPSULE ORAL
Status: DISCONTINUED | OUTPATIENT
Start: 2023-04-01 | End: 2023-04-03 | Stop reason: HOSPADM

## 2023-04-01 RX ORDER — MELATONIN
5000 DAILY
Status: DISCONTINUED | OUTPATIENT
Start: 2023-04-01 | End: 2023-04-03 | Stop reason: HOSPADM

## 2023-04-01 RX ORDER — GABAPENTIN 100 MG/1
100 CAPSULE ORAL DAILY
Status: DISCONTINUED | OUTPATIENT
Start: 2023-04-02 | End: 2023-04-03 | Stop reason: HOSPADM

## 2023-04-01 RX ORDER — LANOLIN ALCOHOL/MO/W.PET/CERES
3 CREAM (GRAM) TOPICAL
Status: DISCONTINUED | OUTPATIENT
Start: 2023-04-01 | End: 2023-04-03 | Stop reason: HOSPADM

## 2023-04-01 RX ADMIN — LEVETIRACETAM 500 MG: 500 TABLET, FILM COATED ORAL at 21:07

## 2023-04-01 RX ADMIN — Medication 5000 UNITS: at 13:58

## 2023-04-01 RX ADMIN — CARBIDOPA AND LEVODOPA 1 TABLET: 10; 100 TABLET ORAL at 21:07

## 2023-04-01 RX ADMIN — HEPARIN SODIUM 5000 UNITS: 5000 INJECTION INTRAVENOUS; SUBCUTANEOUS at 13:59

## 2023-04-01 RX ADMIN — DULOXETINE HYDROCHLORIDE 30 MG: 30 CAPSULE, DELAYED RELEASE ORAL at 08:17

## 2023-04-01 RX ADMIN — SODIUM CHLORIDE 250 ML: 0.9 INJECTION, SOLUTION INTRAVENOUS at 12:10

## 2023-04-01 RX ADMIN — CARBIDOPA AND LEVODOPA 1 TABLET: 10; 100 TABLET ORAL at 08:16

## 2023-04-01 RX ADMIN — LEVETIRACETAM 500 MG: 500 TABLET, FILM COATED ORAL at 08:16

## 2023-04-01 RX ADMIN — SENNOSIDES AND DOCUSATE SODIUM 2 TABLET: 8.6; 5 TABLET ORAL at 08:15

## 2023-04-01 RX ADMIN — ACETAMINOPHEN 650 MG: 325 TABLET ORAL at 02:37

## 2023-04-01 RX ADMIN — PANTOPRAZOLE SODIUM 20 MG: 20 TABLET, DELAYED RELEASE ORAL at 05:36

## 2023-04-01 RX ADMIN — GABAPENTIN 300 MG: 300 CAPSULE ORAL at 21:07

## 2023-04-01 RX ADMIN — ATORVASTATIN CALCIUM 40 MG: 40 TABLET, FILM COATED ORAL at 08:16

## 2023-04-01 RX ADMIN — SENNOSIDES AND DOCUSATE SODIUM 2 TABLET: 8.6; 5 TABLET ORAL at 16:46

## 2023-04-01 RX ADMIN — ACETAMINOPHEN 650 MG: 325 TABLET ORAL at 10:47

## 2023-04-01 RX ADMIN — CARBIDOPA AND LEVODOPA 1 TABLET: 10; 100 TABLET ORAL at 16:46

## 2023-04-01 RX ADMIN — B-COMPLEX W/ C & FOLIC ACID TAB 1 TABLET: TAB at 13:58

## 2023-04-01 RX ADMIN — MELATONIN 3 MG: at 21:07

## 2023-04-01 RX ADMIN — HEPARIN SODIUM 5000 UNITS: 5000 INJECTION INTRAVENOUS; SUBCUTANEOUS at 05:36

## 2023-04-01 NOTE — ASSESSMENT & PLAN NOTE
- traumatic  - repeat [facility] 3/30 stable  -Per neurosurgery, will repeat CT head again on 4/1 for stability  -Q4hr neurochecks  -Started on DVT ppx heparin  -Seizure ppx with Keppra x7 days  -PT/OT

## 2023-04-01 NOTE — QUICK NOTE
Discussed CT results with Dian Ashley from neurosurgery  A 48 hour hold of DVT ppx was recommended  Will also order repeat [address] for Monday  Results reviewed with patient and his wife  Patient agreeable with continued admission at this time

## 2023-04-01 NOTE — PLAN OF CARE
Problem: MOBILITY - ADULT  Goal: Maintain or return to baseline ADL function  Description: INTERVENTIONS:  -  Assess patient's ability to carry out ADLs; assess patient's baseline for ADL function and identify physical deficits which impact ability to perform ADLs (bathing, care of mouth/teeth, toileting, grooming, dressing, etc )  - Assess/evaluate cause of self-care deficits   - Assess range of motion  - Assess patient's mobility; develop plan if impaired  - Assess patient's need for assistive devices and provide as appropriate  - Encourage maximum independence but intervene and supervise when necessary  - Involve family in performance of ADLs  - Assess for home care needs following discharge   - Consider OT consult to assist with ADL evaluation and planning for discharge  - Provide patient education as appropriate  Outcome: Progressing  Goal: Maintains/Returns to pre admission functional level  Description: INTERVENTIONS:  - Perform BMAT or MOVE assessment daily    - Set and communicate daily mobility goal to care team and patient/family/caregiver  - Collaborate with rehabilitation services on mobility goals if consulted  - Perform Range of Motion **3* times a day  - Reposition patient every *2** hours    - Dangle patient *3** times a day  - Stand patient **3* times a day  - Ambulate patient *3** times a day  - Out of bed to chair *3** times a day   - Out of bed for meals *3** times a day  - Out of bed for toileting  - Record patient progress and toleration of activity level   Outcome: Progressing     Problem: PAIN - ADULT  Goal: Verbalizes/displays adequate comfort level or baseline comfort level  Description: Interventions:  - Encourage patient to monitor pain and request assistance  - Assess pain using appropriate pain scale  - Administer analgesics based on type and severity of pain and evaluate response  - Implement non-pharmacological measures as appropriate and evaluate response  - Consider cultural and social influences on pain and pain management  - Notify physician/advanced practitioner if interventions unsuccessful or patient reports new pain  Outcome: Progressing     Problem: INFECTION - ADULT  Goal: Absence or prevention of progression during hospitalization  Description: INTERVENTIONS:  - Assess and monitor for signs and symptoms of infection  - Monitor lab/diagnostic results  - Monitor all insertion sites, i e  indwelling lines, tubes, and drains  - Monitor endotracheal if appropriate and nasal secretions for changes in amount and color  - [place] appropriate cooling/warming therapies per order  - Administer medications as ordered  - Instruct and encourage patient and family to use good hand hygiene technique  - Identify and instruct in appropriate isolation precautions for identified infection/condition  Outcome: Progressing  Goal: Absence of fever/infection during neutropenic period  Description: INTERVENTIONS:  - Monitor WBC    Outcome: Progressing     Problem: SAFETY ADULT  Goal: Maintain or return to baseline ADL function  Description: INTERVENTIONS:  -  Assess patient's ability to carry out ADLs; assess patient's baseline for ADL function and identify physical deficits which impact ability to perform ADLs (bathing, care of mouth/teeth, toileting, grooming, dressing, etc )  - Assess/evaluate cause of self-care deficits   - Assess range of motion  - Assess patient's mobility; develop plan if impaired  - Assess patient's need for assistive devices and provide as appropriate  - Encourage maximum independence but intervene and supervise when necessary  - Involve family in performance of ADLs  - Assess for home care needs following discharge   - Consider OT consult to assist with ADL evaluation and planning for discharge  - Provide patient education as appropriate  Outcome: Progressing  Goal: Maintains/Returns to pre admission functional level  Description: INTERVENTIONS:  - Perform BMAT or MOVE assessment daily    - Set and communicate daily mobility goal to care team and patient/family/caregiver  - Collaborate with rehabilitation services on mobility goals if consulted  - Perform Range of Motion **3* times a day  - Reposition patient every *2** hours    - Dangle patient *3** times a day  - Stand patient *3** times a day  - Ambulate patient *3** times a day  - Out of bed to chair **3* times a day   - Out of bed for meals *3** times a day  - Out of bed for toileting  - Record patient progress and toleration of activity level   Outcome: Progressing  Goal: Patient will remain free of falls  Description: INTERVENTIONS:  - Educate patient/family on patient safety including physical limitations  - Instruct patient to call for assistance with activity   - Consult OT/PT to assist with strengthening/mobility   - Keep Call bell within reach  - Keep bed low and locked with side rails adjusted as appropriate  - Keep care items and personal belongings within reach  - Initiate and maintain comfort rounds  - Make Fall Risk Sign visible to staff  - Offer Toileting every **2* Hours, in advance of need  - Initiate/Maintain **bed/chair*alarm  - Obtain necessary fall risk management equipment:   - Apply yellow socks and bracelet for high fall risk patients  - Consider moving patient to room near nurses station  Outcome: Progressing     Problem: DISCHARGE PLANNING  Goal: Discharge to home or other facility with appropriate resources  Description: INTERVENTIONS:  - Identify barriers to discharge w/patient and caregiver  - Arrange for needed discharge resources and transportation as appropriate  - Identify discharge learning needs (meds, wound care, etc )  - Arrange for interpretive services to assist at discharge as needed  - Refer to Case Management Department for coordinating discharge planning if the patient needs post-hospital services based on physician/advanced practitioner order or complex needs related to functional status, cognitive ability, or social support system  Outcome: Progressing     Problem: Knowledge Deficit  Goal: Patient/family/caregiver demonstrates understanding of disease process, treatment plan, medications, and discharge instructions  Description: Complete learning assessment and assess knowledge base  Interventions:  - Provide teaching at level of understanding  - Provide teaching via preferred learning methods  Outcome: Progressing     Problem: Nutrition/Hydration-ADULT  Goal: Nutrient/Hydration intake appropriate for improving, restoring or maintaining nutritional needs  Description: Monitor and assess patient's nutrition/hydration status for malnutrition  Collaborate with interdisciplinary team and initiate plan and interventions as ordered  Monitor patient's weight and dietary intake as ordered or per policy  Utilize nutrition screening tool and intervene as necessary  Determine patient's food preferences and provide high-protein, high-caloric foods as appropriate       INTERVENTIONS:  - Monitor oral intake, urinary output, labs, and treatment plans  - Assess nutrition and hydration status and recommend course of action  - Evaluate amount of meals eaten  - Assist patient with eating if necessary   - Allow adequate time for meals  - Recommend/ encourage appropriate diets, oral nutritional supplements, and vitamin/mineral supplements  - Order, calculate, and assess calorie counts as needed  - Recommend, monitor, and adjust tube feedings and TPN/PPN based on assessed needs  - Assess need for intravenous fluids  - Provide specific nutrition/hydration education as appropriate  - Include patient/family/caregiver in decisions related to nutrition  Outcome: Progressing     Problem: Prexisting or High Potential for Compromised Skin Integrity  Goal: Skin integrity is maintained or improved  Description: INTERVENTIONS:  - Identify patients at risk for skin breakdown  - Assess and monitor skin integrity  - Assess and monitor nutrition and hydration status  - Monitor labs   - Assess for incontinence   - Turn and reposition patient  - Assist with mobility/ambulation  - Relieve pressure over bony prominences  - Avoid friction and shearing  - Provide appropriate hygiene as needed including keeping skin clean and dry  - Evaluate need for skin moisturizer/barrier cream  - Collaborate with interdisciplinary team   - Patient/family teaching  - Consider wound care consult   Outcome: Progressing     Problem: NEUROSENSORY - ADULT  Goal: Achieves stable or improved neurological status  Description: INTERVENTIONS  - Monitor and report changes in neurological status  - Monitor vital signs such as temperature, blood pressure, glucose, and any other labs ordered   - Initiate measures to prevent increased intracranial pressure  - Monitor for seizure activity and implement precautions if appropriate      Outcome: Progressing  Goal: Achieves maximal functionality and self care  Description: INTERVENTIONS  - Monitor swallowing and airway patency with patient fatigue and changes in neurological status  - Encourage and assist patient to increase activity and self care     - Encourage visually impaired, hearing impaired and aphasic patients to use assistive/communication devices  Outcome: Progressing

## 2023-04-01 NOTE — ASSESSMENT & PLAN NOTE
- secondary to fall at home  - bilateral  - coumadin reversed prior to transfer  - neurosurgery consult, appreciate recommendations  - DVT ppx heparin  - normal neuro exam  - Q4hr neurochecks  - Seizure ppx with Keppra x7 days  - PT/OT - recommend postacute rehab  Follow up repeat [facility] 4/1

## 2023-04-01 NOTE — PROGRESS NOTES
[facility]  Progress Note  Name: Patrizia Doll  MRN: [id number]  Unit/Bed#: [facility] 671-18 I Date of Admission: 3/29/2023   Date of Service: 4/1/2023 I Hospital Day: 3    Assessment/Plan   Atrial fibrillation [facility])  Assessment & Plan  - Hold home Coumadin in setting of SDH  - Carvedilol for rate control    Parkinson's disease ([facility] [address] )  Assessment & Plan  - Continue home Sinemet    SDH (subdural hematoma) ([facility] [address] )  Assessment & Plan  - secondary to fall at home  - bilateral  - coumadin reversed prior to transfer  - neurosurgery consult, appreciate recommendations  - DVT ppx heparin  - normal neuro exam  - Q4hr neurochecks  - Seizure ppx with Keppra x7 days  - PT/OT - recommend postacute rehab  Follow up [facility] 4/1    Fall from standing  Assessment & Plan  - Status post unwitnessed fall with the below noted injuries  - Fall precautions  - Geriatric Medicine consultation for evaluation, medication review and recommendations   - PT and OT evaluation and treatment as indicated  - Case Management consultation for disposition planning  * SAH (subarachnoid hemorrhage) (HCC)  Assessment & Plan  - traumatic  - repeat [facility] 3/30 stable  -Per neurosurgery, will repeat CT head again on 4/1 for stability  -Q4hr neurochecks  -Started on DVT ppx heparin  -Seizure ppx with Keppra x7 days  -PT/OT           Bowel Regimen: Senokot  VTE Prophylaxis:Heparin     Disposition: Continue hospitalization, medsur    Subjective   Chief Complaint: Headache    Subjective: Currently, patient describes frontal headache which is similar in character to prior headaches  No acute nursing concerns at this time; however, patient was noted to continue to be repetitive despite maintenance of orientation  Patient has bowel regimen ordered and is on VTE prophylaxis  Patient has been producing urine his last bowel movement 2 days ago  Last labs 3/31  Patient has been afebrile and hemodynamically stable  Objective   Vitals:   Temp:  [98 1 °F (36 7 °C)-100 3 °F (37 9 °C)] 99 1 °F (37 3 °C)  HR:  [62-72] 62  Resp:  [16-18] 18  BP: ()/(50-61) 97/50    I/O       03/30 0701  03/31 0700 03/31 0701  04/01 0700 04/01 0701  04/02 0700    P  O  120 240     I V  (mL/kg) 30 (0 5)      IV Piggyback 200      Total Intake(mL/kg) 350 (5 4) 240 (3 7)     Urine (mL/kg/hr) 350 (0 2)      Stool 0      Total Output 350      Net 0 +240            Unmeasured Urine Occurrence 2 x 2 x     Unmeasured Stool Occurrence 1 x 2 x            Physical Exam:   GENERAL APPEARANCE: NAD  NEURO: Subjective decrease sensation of the left face (patient reports is chronic), cranial nerves 2-12 otherwise intact  5/5 strength in all four extremities  Patient reports sensation to light touch at baseline: Decreased sensation to light touch in the left upper and lower extremities that he says is chronic from prior stroke  Patient is speaking clearly in complete sentences  Patient is answering appropriately and able follow commands  HEENT: PERRL, Moist mucous membranes, external ears normal, nose normal   Left-sided facial bruising present  Neck: No cervical adenopathy  CV: RRR  No murmurs, rubs, gallops  LUNGS: Clear to auscultation: No wheezes, stridor, rhonchi, rales  GI: Abdomen non-distended  Soft  Non-tender and without rebound or guarding   : Deferred at this time  MSK: No deformity  Skin: Warm and dry  Capillary refill: <2 seconds    Invasive Devices     Peripheral Intravenous Line  Duration           Peripheral IV 03/30/23 Right Antecubital 1 day                      Lab Results: BMP/CMP: No results found for: SODIUM, K, CL, CO2, ANIONGAP, BUN, CREATININE, GLUCOSE, CALCIUM, AST, ALT, ALKPHOS, PROT, BILITOT, EGFR and CBC: No results found for: WBC, HGB, HCT, MCV, PLT, ADJUSTEDWBC, MCH, MCHC, RDW, MPV, NRBC  Imaging: I have personally reviewed pertinent reports       Other Studies: N/A

## 2023-04-01 NOTE — QUICK NOTE
Patient's wife now at bedside  She was updated regarding the patient  She also provided patient's current medication list and is requesting that his full medications be ordered   She states patient is at his baseline mental status

## 2023-04-01 NOTE — QUICK NOTE
Patient's wife and primary contact called at the listed number to provide an update  There was no answer

## 2023-04-01 NOTE — TREATMENT PLAN
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, walks with cane at baseline      Imaging:   • 3/20 repeat CTH: Intracranial hemorrhages, as described above, without significant change compared to yesterday  • 3/29 CTH:  Moderate right and small left SDH, small R > L parenchymal hematomas and mild R SAH 1 0 cm nodular right temporal parenchymal hemorrhage superimposed on large area of encephalomalacia, old MCA infarct (2/17)  Stable size and configuration of the ventricles, within normal limits for age  Marco Antonio Cousins ex vacuo lateral ventricle dilatation   No intraventricular hemorrhage  • CT head wo 4/1/23: More homogeneous and slightly greater increased attenuation of the bilateral subdural hemorrhagic collections without significant interval change in size or degree of associated mass effect  Stable right temporal and left frontal hemorrhagic contusions  Stable trace subarachnoid hemorrhage  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      Plan:   • Continue to closely monitor neuro exam   ? Frequent neuro checks per primary team   ? Repeat STAT CTH with any acute decline in GCS   • Maintain normotensive BP goals, SBP < 160   • Continue keppra 500mg IV q 12hrs x 7 days for seizure ppx per trauma team   • Hold all AC/AP meds   ? Home coumadin to be held until cleared by nsgy in the office   • DVT ppx: SCDs  • pain control per primary team   • PT/OT   • Medical management per primary team   ? Continue home meds as ordered by primary team  • Social work following for assistance with dispo once medically cleared   • No nsx intervention anticipated at this time     • Though bilateral mixed density SDH are stable in size they are noted to have slightly greater attenuation compared to prior imaging  • Continue to hold pharm dvt at this time  Consider repeat CTH in 48 hours, if stable, pt may start pharm dvt ppx as indicated/needed       Neurosurgery will follow from the periphery  Pt will be scheduled to be seen in the nsgy office in 2 weeks with a repeat [address] [place] [address] for follow-up and further discussion about [facility] therapy   Please reach out with any further questions or concerns      SAH (subarachnoid hemorrhage) ([address] )  Assessment & Plan  - see plan as further documented above

## 2023-04-02 PROBLEM — M54.50 CHRONIC RIGHT-SIDED LOW BACK PAIN: Status: ACTIVE | Noted: 2023-04-02

## 2023-04-02 PROBLEM — G89.29 CHRONIC RIGHT-SIDED LOW BACK PAIN: Status: ACTIVE | Noted: 2023-04-02

## 2023-04-02 RX ADMIN — GABAPENTIN 300 MG: 300 CAPSULE ORAL at 21:46

## 2023-04-02 RX ADMIN — DULOXETINE HYDROCHLORIDE 30 MG: 30 CAPSULE, DELAYED RELEASE ORAL at 09:08

## 2023-04-02 RX ADMIN — LEVETIRACETAM 500 MG: 500 TABLET, FILM COATED ORAL at 21:46

## 2023-04-02 RX ADMIN — MELATONIN 3 MG: at 21:46

## 2023-04-02 RX ADMIN — ACETAMINOPHEN 650 MG: 325 TABLET ORAL at 21:46

## 2023-04-02 RX ADMIN — CARBIDOPA AND LEVODOPA 1 TABLET: 10; 100 TABLET ORAL at 21:46

## 2023-04-02 RX ADMIN — ATORVASTATIN CALCIUM 40 MG: 40 TABLET, FILM COATED ORAL at 09:08

## 2023-04-02 RX ADMIN — GABAPENTIN 100 MG: 100 CAPSULE ORAL at 09:07

## 2023-04-02 RX ADMIN — B-COMPLEX W/ C & FOLIC ACID TAB 1 TABLET: TAB at 09:07

## 2023-04-02 RX ADMIN — PANTOPRAZOLE SODIUM 20 MG: 20 TABLET, DELAYED RELEASE ORAL at 06:10

## 2023-04-02 RX ADMIN — CARBIDOPA AND LEVODOPA 1 TABLET: 10; 100 TABLET ORAL at 09:09

## 2023-04-02 RX ADMIN — LEVETIRACETAM 500 MG: 500 TABLET, FILM COATED ORAL at 09:08

## 2023-04-02 RX ADMIN — Medication 5000 UNITS: at 09:08

## 2023-04-02 RX ADMIN — CARBIDOPA AND LEVODOPA 1 TABLET: 10; 100 TABLET ORAL at 16:31

## 2023-04-02 NOTE — ASSESSMENT & PLAN NOTE
- traumatic  - repeat CTH 3/30 stable  -Q4hr neurochecks  -Seizure ppx with Keppra x7 days  -PT/OT  - stable per 4/2 CTH

## 2023-04-02 NOTE — PLAN OF CARE
Problem: PAIN - ADULT  Goal: Verbalizes/displays adequate comfort level or baseline comfort level  Description: Interventions:  - Encourage patient to monitor pain and request assistance  - Assess pain using appropriate pain scale  - Administer analgesics based on type and severity of pain and evaluate response  - Implement non-pharmacological measures as appropriate and evaluate response  - Consider cultural and social influences on pain and pain management  - Notify physician/advanced practitioner if interventions unsuccessful or patient reports new pain  Outcome: Progressing     Problem: INFECTION - ADULT  Goal: Absence or prevention of progression during hospitalization  Description: INTERVENTIONS:  - Assess and monitor for signs and symptoms of infection  - Monitor lab/diagnostic results  - Monitor all insertion sites, i e  indwelling lines, tubes, and drains  - Monitor endotracheal if appropriate and nasal secretions for changes in amount and color  - [place] appropriate cooling/warming therapies per order  - Administer medications as ordered  - Instruct and encourage patient and family to use good hand hygiene technique  - Identify and instruct in appropriate isolation precautions for identified infection/condition  Outcome: Progressing  Goal: Absence of fever/infection during neutropenic period  Description: INTERVENTIONS:  - Monitor WBC    Outcome: Progressing

## 2023-04-02 NOTE — PROGRESS NOTES
[facility]  Progress Note  Name: Alexi Parham  MRN: [id number]  [facility] Date of Admission: 3/29/2023   Date of Service: 4/2/2023 I Hospital Day: 4    Assessment/Plan   Chronic right-sided low back pain  Assessment & Plan  - PRN lidocaine patch, tylenol    Atrial fibrillation (HCC)  Assessment & Plan  - Hold home Coumadin in setting of SDH  - Carvedilol for rate control    Parkinson's disease ([facility] [address] )  Assessment & Plan  - Continue home Sinemet    SDH (subdural hematoma) (HCC)  Assessment & Plan  - secondary to fall at home  - bilateral  - coumadin reversed prior to transfer  - neurosurgery consult, appreciate recommendations  - DVT ppx heparin - held  - normal neuro exam  - Q4hr neurochecks  - Seizure ppx with Keppra x7 days  - PT/OT - recommend postacute rehab  - repeat 4/1 CTH - enhanced SDH  - hold DVT ppx x48 hrs per neurosurgery  - repeat [facility] 4/3    Fall from standing  Assessment & Plan  - Status post unwitnessed fall with the below noted injuries  - Fall precautions  - Geriatric Medicine consultation for evaluation, medication review and recommendations   - PT and OT evaluation and treatment as indicated  - Case Management consultation for disposition planning  * SAH (subarachnoid hemorrhage) (HCC)  Assessment & Plan  - traumatic  - repeat CTH 3/30 stable  -Q4hr neurochecks  -Seizure ppx with Keppra x7 days  -PT/OT  - stable per 4/2 CTH           Bowel Regimen: senokot  VTE Prophylaxis:RX contraindicated due to: worsening SDH     Disposition: remain inpatient    Subjective   Chief Complaint: right lower back pain    Subjective: pt reports some R lower back pain overnight  Similar in location and character to previous episodes of back pain he has had for years  Denies urinary/bowel incontinence or any neurological sxs       Objective   Vitals:   Temp:  [98 1 °F (36 7 °C)-98 9 °F (37 2 °C)] 98 9 °F (37 2 °C)  HR:  [60-77] 69  Resp:  [16] 16  BP: ()/(51-87) 102/72    I/O       03/31 0701  04/01 0700 04/01 0701 04/02 0700 04/02 0701 04/03 0700    P  O  240 120     I V  (mL/kg)       IV Piggyback       Total Intake(mL/kg) 240 (3 7) 120 (1 9)     Urine (mL/kg/hr)       Stool       Total Output       Net +240 +120            Unmeasured Urine Occurrence 2 x 3 x     Unmeasured Stool Occurrence 2 x 2 x            Physical Exam:   Physical Exam  General: well-appearing, no acute distress  HEENT: PERRL  Neuro: A&O x3, CN II-XII intact, no cerebellar dysfunction, motor and sensation intact throughout  CV: RRR, pulses 2+ and symmetric throughout  Pulm: CTA bilaterally, normal work of breathing  GI: abdomen soft, non-distended, non-tender, no signs of peritonitis  : deferred  MSK: ROM normal all joints, no tenderness  Skin: warm and dry, periorbital ecchymosis L side  Psych: calm and cooperative    Invasive Devices     Peripheral Intravenous Line  Duration           Peripheral IV 03/30/23 Right Antecubital 2 days                      Lab Results: Results: I have personally reviewed all pertinent laboratory/tests results  Imaging: I have personally reviewed pertinent reports     [facility]  Other Studies:

## 2023-04-02 NOTE — ASSESSMENT & PLAN NOTE
- secondary to fall at home  - bilateral  - coumadin reversed prior to transfer  - neurosurgery consult, appreciate recommendations  - DVT ppx heparin - held  - normal neuro exam  - Q4hr neurochecks  - Seizure ppx with Keppra x7 days  - PT/OT - recommend postacute rehab  - repeat 4/1 CTH - enhanced SDH  - hold DVT ppx x48 hrs per neurosurgery  - repeat [facility] 4/3

## 2023-04-03 ENCOUNTER — APPOINTMENT (INPATIENT)
Dept: RADIOLOGY | Facility: HOSPITAL | Age: 88
End: 2023-04-03

## 2023-04-03 VITALS
DIASTOLIC BLOOD PRESSURE: 65 MMHG | HEART RATE: 79 BPM | RESPIRATION RATE: 16 BRPM | HEIGHT: 70 IN | TEMPERATURE: 99 F | OXYGEN SATURATION: 95 % | BODY MASS INDEX: 20.45 KG/M2 | WEIGHT: 142.86 LBS | SYSTOLIC BLOOD PRESSURE: 123 MMHG

## 2023-04-03 RX ORDER — LIDOCAINE 50 MG/G
1 PATCH TOPICAL DAILY PRN
Qty: 15 PATCH | Refills: 0 | Status: SHIPPED | OUTPATIENT
Start: 2023-04-03

## 2023-04-03 RX ORDER — ACETAMINOPHEN 325 MG/1
650 TABLET ORAL EVERY 6 HOURS PRN
Refills: 0
Start: 2023-04-03

## 2023-04-03 RX ORDER — LEVETIRACETAM 500 MG/1
500 TABLET ORAL EVERY 12 HOURS SCHEDULED
Qty: 4 TABLET | Refills: 0 | Status: SHIPPED | OUTPATIENT
Start: 2023-04-03 | End: 2023-04-05

## 2023-04-03 RX ADMIN — B-COMPLEX W/ C & FOLIC ACID TAB 1 TABLET: TAB at 09:13

## 2023-04-03 RX ADMIN — CARVEDILOL 6.25 MG: 6.25 TABLET, FILM COATED ORAL at 09:17

## 2023-04-03 RX ADMIN — GABAPENTIN 100 MG: 100 CAPSULE ORAL at 09:11

## 2023-04-03 RX ADMIN — ATORVASTATIN CALCIUM 40 MG: 40 TABLET, FILM COATED ORAL at 09:13

## 2023-04-03 RX ADMIN — DULOXETINE HYDROCHLORIDE 30 MG: 30 CAPSULE, DELAYED RELEASE ORAL at 09:13

## 2023-04-03 RX ADMIN — Medication 5000 UNITS: at 09:12

## 2023-04-03 RX ADMIN — CARBIDOPA AND LEVODOPA 1 TABLET: 10; 100 TABLET ORAL at 09:13

## 2023-04-03 RX ADMIN — LEVETIRACETAM 500 MG: 500 TABLET, FILM COATED ORAL at 09:12

## 2023-04-03 RX ADMIN — PANTOPRAZOLE SODIUM 20 MG: 20 TABLET, DELAYED RELEASE ORAL at 05:33

## 2023-04-03 NOTE — PLAN OF CARE
Problem: PAIN - ADULT  Goal: Verbalizes/displays adequate comfort level or baseline comfort level  Description: Interventions:  - Encourage patient to monitor pain and request assistance  - Assess pain using appropriate pain scale  - Administer analgesics based on type and severity of pain and evaluate response  - Implement non-pharmacological measures as appropriate and evaluate response  - Consider cultural and social influences on pain and pain management  - Notify physician/advanced practitioner if interventions unsuccessful or patient reports new pain  Outcome: Progressing     Problem: MOBILITY - ADULT  Goal: Maintain or return to baseline ADL function  Description: INTERVENTIONS:  -  Assess patient's ability to carry out ADLs; assess patient's baseline for ADL function and identify physical deficits which impact ability to perform ADLs (bathing, care of mouth/teeth, toileting, grooming, dressing, etc )  - Assess/evaluate cause of self-care deficits   - Assess range of motion  - Assess patient's mobility; develop plan if impaired  - Assess patient's need for assistive devices and provide as appropriate  - Encourage maximum independence but intervene and supervise when necessary  - Involve family in performance of ADLs  - Assess for home care needs following discharge   - Consider OT consult to assist with ADL evaluation and planning for discharge  - Provide patient education as appropriate  Outcome: Progressing     Problem: INFECTION - ADULT  Goal: Absence or prevention of progression during hospitalization  Description: INTERVENTIONS:  - Assess and monitor for signs and symptoms of infection  - Monitor lab/diagnostic results  - Monitor all insertion sites, i e  indwelling lines, tubes, and drains  - Monitor endotracheal if appropriate and nasal secretions for changes in amount and color  - [place] appropriate cooling/warming therapies per order  - Administer medications as ordered  - Instruct and encourage patient and family to use good hand hygiene technique  - Identify and instruct in appropriate isolation precautions for identified infection/condition  Outcome: Progressing

## 2023-04-03 NOTE — ASSESSMENT & PLAN NOTE
- secondary to fall at home  - bilateral  - coumadin reversed prior to transfer  - neurosurgery consult, appreciate recommendations  - normal neuro exam, GCS 15, nonfocal, baseline tremor  - Q4hr neurochecks  - Seizure ppx with Keppra x7 days  - PT/OT - recommend postacute rehab  - repeat 4/1 CTH - enhanced SDH  - repeat 4/3 CTH - stable b/l SDH  - cleared for [facility] for DVT ppx, but patient is likely going to be discharged home w VNA today  - hold all AC/AP  - Follow up with Neurosurgery in 2 weeks outpatient

## 2023-04-03 NOTE — TELEPHONE ENCOUNTER
04/04/2023-PLEASE SEE KIRK'S TELEPHONE NOTE  CT HEAD SCHEDULED ON 04/13/2023 04/03/2023-PT STILL IN HOSPITAL

## 2023-04-03 NOTE — RESTORATIVE TECHNICIAN NOTE
Restorative Technician Note      Patient Name: Juliane Lugo     Restorative Tech Visit Date: 04/03/23  Note Type: Mobility  Patient Position Upon Consult: Bedside chair  Activity Performed: Transferred  Assistive Device: Roller walker  Patient Position at End of Consult: Supine;  All needs within reach; Bed/Chair alarm activated    Robert Bose Restorative Technician

## 2023-04-03 NOTE — TREATMENT PLAN
Repeat CTH reviewed by nsgy team and radiologist  shaun SDH appear stable in size  Pt cleared to start dvt ppx from a nsgy standpoint, would use SQH instead of Lovenox  Pt scheduled to be seen in the nsgy clinic for follow-up in 2 weeks  Continue to hold [facility] until cleared by nsgy in the clinic      nsgy will sign off at this time  Please reach out with any further questions or concerns

## 2023-04-03 NOTE — ASSESSMENT & PLAN NOTE
- Hold home Coumadin in setting of SDH, reversed 3/29  - Hold all AC/AP x 2 weeks until cleared by neurosurgery  - Carvedilol for rate control

## 2023-04-03 NOTE — ASSESSMENT & PLAN NOTE
- Baseline tremor  - Continue home Sinemet  - Appreciate Geriatric medicine consult and recommendations

## 2023-04-03 NOTE — ASSESSMENT & PLAN NOTE
- traumatic  - repeat CTH 3/30 stable  -Q4hr neurochecks  -Seizure ppx with Keppra x7 days  -PT/OT  - stable per 4/3 CTH  - Follow up with NSG with repeat CT head in 2 weeks

## 2023-04-03 NOTE — DISCHARGE SUMMARY
[facility]  Discharge- Kati Madrigal 8/29/1933, 80 y o  male MRN: [id number]  Unit/Bed#: [facility] 606-01 Encounter: [id number]  Primary Care Provider: Christiano Knutosn DO   Date and time admitted to hospital: 3/29/2023  4:53 PM    Chronic right-sided low back pain  Assessment & Plan  - PRN lidocaine patch, tylenol    Atrial fibrillation ([facility] )  Assessment & Plan  - Hold home Coumadin in setting of SDH, reversed 3/29  - Hold all AC/AP x 2 weeks until cleared by neurosurgery  - Carvedilol for rate control    Parkinson's disease ([facility] )  Assessment & Plan  - Baseline tremor  - Continue home Sinemet  - Appreciate Geriatric medicine consult and recommendations    SDH (subdural hematoma) ([facility] )  Assessment & Plan  - secondary to fall at home  - bilateral  - coumadin reversed prior to transfer  - neurosurgery consult, appreciate recommendations  - normal neuro exam, GCS 15, nonfocal, baseline tremor  - Q4hr neurochecks  - Seizure ppx with Keppra x7 days  - PT/OT - recommend postacute rehab  - repeat 4/1 CTH - enhanced SDH  - repeat 4/3 CTH - stable b/l SDH  - cleared for [facility] for DVT ppx, but patient is likely going to be discharged home w VNA today  - hold all AC/AP  - Follow up with Neurosurgery in 2 weeks outpatient    Fall from standing  Assessment & Plan  - Status post unwitnessed fall with the below noted injuries  - Fall precautions  - Geriatric Medicine consultation for evaluation, medication review and recommendations   - PT and OT evaluation and treatment as indicated  - Case Management consultation for disposition planning        * SAH (subarachnoid hemorrhage) (HCC)  Assessment & Plan  - traumatic  - repeat CTH 3/30 stable  -Q4hr neurochecks  -Seizure ppx with Keppra x7 days  -PT/OT  - stable per 4/3 CTH  - Follow up with NSG with repeat CT head in 2 weeks        Bowel Regimen: senokot s  VTE Prophylaxis:Reason for no pharmacologic prophylaxis acute intracranial hemorrhages "during hospitalization; cleared for [facility] today but also cleared for discharge  No DVT ppx required on DC  Otherwise hold all antiplatelet/ anticoagulation medications     Disposition: Recommended rehab  Family and patient refuse and plan to take patient home today with VNA    Subjective   Chief Complaint: \"My back hurts\"    Subjective: Pt reports that he has chronic back pain, and it helps to reposition himself in bed  Otherwise, he reports a mild headache  He is hoping to go home today  Objective   Vitals:   Temp:  [98 2 °F (36 8 °C)-99 °F (37 2 °C)] 99 °F (37 2 °C)  HR:  [71-79] 79  BP: (100-123)/(64-73) 123/65    I/O       04/01 0701  04/02 0700 04/02 0701  04/03 0700 04/03 0701  04/04 0700    P  O  120  100    Total Intake(mL/kg) 120 (1 9)  100 (1 5)    Urine (mL/kg/hr)  100 (0 1)     Total Output  100     Net +120 -100 +100           Unmeasured Urine Occurrence 3 x      Unmeasured Stool Occurrence 3 x             Physical Exam:   GENERAL APPEARANCE: Patient in no acute distress  HEENT: Left sided facial ecchymosis; PERRL, EOMs intact; Mucous membranes moist  NECK / BACK: Nontender  CV: Irregularly irregular rate and rhythm; no murmur/gallops/rubs appreciated  CHEST / LUNGS: Clear to auscultation; no wheezes/rales/rhonci  ABD: NABS; soft; non-distended; non-tender  : Voiding  EXT: +2 pulses bilaterally upper & lower extremities; no edema  NEURO: GCS 15; baseline tremor from parkinson's; no focal neurologic deficits; neurovascularly intact  SKIN: Warm, dry and well perfused; no rash; no jaundice  Invasive Devices     Peripheral Intravenous Line  Duration           Peripheral IV 03/30/23 Right Antecubital 3 days                      Lab Results: Results: I have personally reviewed all pertinent laboratory/tests results  Imaging: I have personally reviewed pertinent reports     see below  Other Studies:   CT head wo contrast   Final Result by Harrison Martinez DO (04/03 0820)      Stable " "appearance of bilateral mixed density acute on subacute, subdural hematomas, right slightly greater than left  Stable rounded focus of hyperdense blood products likely subarachnoid within the sylvian fissure  Stable encephalomalacia and gliosis within the right MCA territory involving the insula as well as the frontal and temporal opercula  Workstation performed: [id number]         CT head wo contrast   Final Result by Suzanne Prieto MD (04/01 1305)      More homogeneous and slightly greater increased attenuation of the bilateral subdural hemorrhagic collections without significant interval change in size or degree of associated mass effect  Stable right temporal and left frontal hemorrhagic contusions  Stable trace subarachnoid hemorrhage  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  Workstation performed: [id number]         CT head wo contrast   Final Result by Parth Perez MD (03/30 4881)      Intracranial hemorrhages, as described above, without significant change compared to yesterday  Please see discussion  The images are available for clinical review  Workstation performed: [id number]         CT head wo contrast    (Results Pending)              Medical Problems     Resolved Problems  Date Reviewed: 4/3/2023   None         Admission Date:   Admission Orders (From admission, onward)     Ordered        03/29/23 1924  Inpatient Admission  Once                        Admitting Diagnosis: SAH (subarachnoid hemorrhage) ([address] ) [I60 9]  SDH (subdural hematoma) ([address] ) [S06  5XAA]  Intraparenchymal hemorrhage of brain ([address] ) [I61 9]  Contusion of face, initial encounter [S00 83XA]  Unspecified multiple injuries, initial encounter [T07  XXXA]    HPI written by Dr Cleo Gonzalez 3/29/23 \"Davian Morales is a 80 y  o  male who presents after a fall at home   Patient states on Monday he was bending down and he went to lift his head and " "hit his head on the corner of a mirror   No loss of conscious   No nausea or vomiting   Complains of pain in the left elbow and radius and ulnar and left face   No change in mental status   Patient does have a past history of a CVA   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   Wife states that he came in as an unwitnessed fall   Found him bruised on Monday when she came back  \"    Procedures Performed: No orders of the defined types were placed in this encounter  Summary of Hospital Course: Please see above and reference hospital medical records  Significant Findings, Care, Treatment and Services Provided: Trauma transfer from [facility] due to acute intracranial hemorrhages  Admitted to critical care due to severity of hemorrhages, patient remained GCS 15 throughout admission  Emergent neurosurgical evaluation completed, and nonoperative management was recommended  Coumadin was reversed with [facility] and Vitamin K  Repeat CTH was stable, and due to severity of bleeds, neurosurgery wanted another close follow up scan 2 days later  4/1 repeat CTH indicated interval enlargement of bilateral SDH, and head scan was repeated 4/3 AM and indicated stability  Neurosurgery then signed off  Geriatric medicine was consulted for chronic condition management  PT and OT evaluated and treated the patient, recommending discharge to rehab  This was discussed with the patient and his wife, and they decided to refuse rehab placement and pursue discharge home with VNA  Complications: Increase in SDH    Condition at Discharge: stable         Discharge instructions/Information to patient and family:   See after visit summary for information provided to patient and family  Provisions for Follow-Up Care:  See after visit summary for information related to follow-up care and any pertinent home health orders  PCP: Ashlee He DO    Disposition: Home    Planned Readmission: No    Discharge Statement   I spent 30 minutes discharging the patient   " This time was spent on the day of discharge  I had direct contact with the patient on the day of discharge  Additional documentation is required if more than 30 minutes were spent on discharge  Discharge Medications:  See after visit summary for reconciled discharge medications provided to patient and family

## 2023-04-03 NOTE — CASE MANAGEMENT
Case Management Discharge Planning Note    Patient name Ming Delong  Location [facility] 606/[facility] 103-60 MRN [id number]  : 1933 Date 4/3/2023       Current Admission Date: 3/29/2023  Current Admission Diagnosis:SAH (subarachnoid hemorrhage) [facility])   Patient Active Problem List    Diagnosis Date Noted   • Chronic right-sided low back pain 2023   • Parkinson's disease ([facility] ) 2023   • Atrial fibrillation ([facility] [facility] ) 2023   • Fall from standing 2023   • SDH (subdural hematoma) ([facility] ) 2023   • SAH (subarachnoid hemorrhage) ([facility] ) 2023      LOS (days): 5  Geometric Mean LOS (GMLOS) (days): 2 90  Days to GMLOS:-1 8     OBJECTIVE:  Risk of Unplanned Readmission Score: 11 02         Current admission status: Inpatient   Preferred Pharmacy:   [facility], [address] [address]  Phone: [phone] Fax: [phone]    Primary Care Provider: Florencia Brady DO    Primary Insurance: Kb Bentley [facility]  Secondary Insurance:     DISCHARGE DETAILS:    CM met with pt's wife  Plan is for d/c home today with [facility]  Pt's family will transport  No other needs at this time

## 2023-04-03 NOTE — RESTORATIVE TECHNICIAN NOTE
Restorative Technician Note      Patient Name: Bethany Osborne     Restorative Tech Visit Date: 04/03/23  Note Type: Mobility  Patient Position Upon Consult: Supine  Activity Performed: Repositioned  Assistive Device: Roller walker  Patient Position at End of Consult: Supine;  All needs within reach; Bed/Chair alarm activated    Alexis Almeida Restorative Technician

## 2023-04-04 ENCOUNTER — TELEPHONE (OUTPATIENT)
Dept: NEUROSURGERY | Facility: CLINIC | Age: 88
End: 2023-04-04

## 2023-04-04 NOTE — TELEPHONE ENCOUNTER
4/4/23 - CALL FROM April, NURSE @ [facility] OF APPT ALREADY SCHED FOR 4/18/23 AT [address]  Jesica Hoffman 4/13/23 AT 1P @ [address] [place] [address] Drive

## 2023-10-12 ENCOUNTER — DOCTOR'S OFFICE (OUTPATIENT)
Dept: URBAN - NONMETROPOLITAN AREA CLINIC 1 | Facility: CLINIC | Age: 88
Setting detail: OPHTHALMOLOGY
End: 2023-10-12
Payer: COMMERCIAL

## 2023-10-12 VITALS — HEIGHT: 60 IN

## 2023-10-12 DIAGNOSIS — H43.813: ICD-10-CM

## 2023-10-12 DIAGNOSIS — H40.053: ICD-10-CM

## 2023-10-12 DIAGNOSIS — H35.051: ICD-10-CM

## 2023-10-12 DIAGNOSIS — H35.3231: ICD-10-CM

## 2023-10-12 PROCEDURE — 92134 CPTRZ OPH DX IMG PST SGM RTA: CPT | Performed by: OPHTHALMOLOGY

## 2023-10-12 PROCEDURE — 92014 COMPRE OPH EXAM EST PT 1/>: CPT | Performed by: OPHTHALMOLOGY

## 2023-10-12 ASSESSMENT — CORNEAL EDEMA CLINICAL DESCRIPTION: OD_CORNEALEDEMA: T

## 2023-10-12 ASSESSMENT — LID POSITION - DERMATOCHALASIS
OS_DERMATOCHALASIS: +1
OD_DERMATOCHALASIS: +1

## 2023-10-12 ASSESSMENT — CONFRONTATIONAL VISUAL FIELD TEST (CVF)
OD_FINDINGS: FULL
OS_FINDINGS: FULL

## 2023-10-13 ASSESSMENT — REFRACTION_AUTOREFRACTION
OD_SPHERE: +1.00
OS_CYLINDER: -2.25
OS_SPHERE: +3.25
OD_AXIS: 089
OS_AXIS: 084
OD_CYLINDER: -1.50

## 2023-10-13 ASSESSMENT — VISUAL ACUITY
OD_BCVA: 20/50-1
OS_BCVA: 20/150-1

## 2023-10-13 ASSESSMENT — SPHEQUIV_DERIVED
OS_SPHEQUIV: 2.125
OD_SPHEQUIV: 0.25

## 2023-10-16 ENCOUNTER — DOCTOR'S OFFICE (OUTPATIENT)
Dept: URBAN - NONMETROPOLITAN AREA CLINIC 1 | Facility: CLINIC | Age: 88
Setting detail: OPHTHALMOLOGY
End: 2023-10-16
Payer: COMMERCIAL

## 2023-10-16 DIAGNOSIS — H53.123: ICD-10-CM

## 2023-10-16 DIAGNOSIS — H40.053: ICD-10-CM

## 2023-10-16 PROCEDURE — 92083 EXTENDED VISUAL FIELD XM: CPT | Performed by: OPHTHALMOLOGY

## 2023-10-19 ENCOUNTER — DOCTOR'S OFFICE (OUTPATIENT)
Dept: URBAN - NONMETROPOLITAN AREA CLINIC 1 | Facility: CLINIC | Age: 88
Setting detail: OPHTHALMOLOGY
End: 2023-10-19
Payer: COMMERCIAL

## 2023-10-19 DIAGNOSIS — H35.051: ICD-10-CM

## 2023-10-19 DIAGNOSIS — H40.053: ICD-10-CM

## 2023-10-19 DIAGNOSIS — H35.3231: ICD-10-CM

## 2023-10-19 PROBLEM — H53.123 SUBJECTIVE VISUAL DISTURBANCE; BOTH EYES: Status: ACTIVE | Noted: 2023-10-12

## 2023-10-19 PROBLEM — H57.13 OCULAR PAIN; BOTH EYES: Status: ACTIVE | Noted: 2023-10-12

## 2023-10-19 PROCEDURE — 99214 OFFICE O/P EST MOD 30 MIN: CPT | Performed by: OPHTHALMOLOGY

## 2023-10-19 PROCEDURE — 92133 CPTRZD OPH DX IMG PST SGM ON: CPT | Performed by: OPHTHALMOLOGY

## 2023-10-19 ASSESSMENT — SPHEQUIV_DERIVED
OS_SPHEQUIV: 2.125
OD_SPHEQUIV: 0.25

## 2023-10-19 ASSESSMENT — LID POSITION - DERMATOCHALASIS
OD_DERMATOCHALASIS: +1
OS_DERMATOCHALASIS: +1

## 2023-10-19 ASSESSMENT — REFRACTION_AUTOREFRACTION
OS_CYLINDER: -2.25
OS_AXIS: 084
OS_SPHERE: +3.25
OD_SPHERE: +1.00
OD_CYLINDER: -1.50
OD_AXIS: 089

## 2023-10-19 ASSESSMENT — CONFRONTATIONAL VISUAL FIELD TEST (CVF)
OS_FINDINGS: FULL
OD_FINDINGS: FULL

## 2023-10-19 ASSESSMENT — VISUAL ACUITY
OD_BCVA: 20/50-2
OS_BCVA: 20/150-1

## 2023-10-19 ASSESSMENT — CORNEAL EDEMA CLINICAL DESCRIPTION: OD_CORNEALEDEMA: T

## 2024-10-10 ENCOUNTER — DOCTOR'S OFFICE (OUTPATIENT)
Dept: URBAN - NONMETROPOLITAN AREA CLINIC 1 | Facility: CLINIC | Age: 89
Setting detail: OPHTHALMOLOGY
End: 2024-10-10
Payer: COMMERCIAL

## 2024-10-10 DIAGNOSIS — H35.051: ICD-10-CM

## 2024-10-10 DIAGNOSIS — H40.053: ICD-10-CM

## 2024-10-10 DIAGNOSIS — H35.3231: ICD-10-CM

## 2024-10-10 DIAGNOSIS — H43.813: ICD-10-CM

## 2024-10-10 PROCEDURE — 99213 OFFICE O/P EST LOW 20 MIN: CPT | Performed by: OPHTHALMOLOGY

## 2024-10-10 PROCEDURE — 92134 CPTRZ OPH DX IMG PST SGM RTA: CPT | Performed by: OPHTHALMOLOGY

## 2024-10-10 ASSESSMENT — CONFRONTATIONAL VISUAL FIELD TEST (CVF)
OD_FINDINGS: FULL
OS_FINDINGS: FULL

## 2024-10-10 ASSESSMENT — REFRACTION_AUTOREFRACTION
OD_AXIS: 089
OD_CYLINDER: -1.50
OS_CYLINDER: -2.25
OD_SPHERE: +1.00
OS_SPHERE: +3.25
OS_AXIS: 084

## 2024-10-10 ASSESSMENT — VISUAL ACUITY
OD_BCVA: 20/50+2
OS_BCVA: 20/70

## 2024-10-10 ASSESSMENT — LID POSITION - DERMATOCHALASIS
OS_DERMATOCHALASIS: +1
OD_DERMATOCHALASIS: +1

## 2024-10-10 ASSESSMENT — CORNEAL EDEMA CLINICAL DESCRIPTION: OD_CORNEALEDEMA: T

## 2024-11-07 ENCOUNTER — DOCTOR'S OFFICE (OUTPATIENT)
Dept: URBAN - NONMETROPOLITAN AREA CLINIC 1 | Facility: CLINIC | Age: 89
Setting detail: OPHTHALMOLOGY
End: 2024-11-07
Payer: COMMERCIAL

## 2024-11-07 DIAGNOSIS — H40.053: ICD-10-CM

## 2024-11-07 DIAGNOSIS — H43.813: ICD-10-CM

## 2024-11-07 DIAGNOSIS — H35.051: ICD-10-CM

## 2024-11-07 DIAGNOSIS — H35.3231: ICD-10-CM

## 2024-11-07 PROCEDURE — 92250 FUNDUS PHOTOGRAPHY W/I&R: CPT | Performed by: OPHTHALMOLOGY

## 2024-11-07 PROCEDURE — 99214 OFFICE O/P EST MOD 30 MIN: CPT | Performed by: OPHTHALMOLOGY

## 2024-11-07 PROCEDURE — 92235 FLUORESCEIN ANGRPH MLTIFRAME: CPT | Performed by: OPHTHALMOLOGY

## 2024-11-07 ASSESSMENT — LID POSITION - DERMATOCHALASIS
OD_DERMATOCHALASIS: +1
OS_DERMATOCHALASIS: +1

## 2024-11-07 ASSESSMENT — REFRACTION_AUTOREFRACTION
OD_SPHERE: +1.00
OS_AXIS: 084
OD_CYLINDER: -1.50
OS_SPHERE: +3.25
OS_CYLINDER: -2.25
OD_AXIS: 089

## 2024-11-07 ASSESSMENT — VISUAL ACUITY
OD_BCVA: 20/60+2
OS_BCVA: 20/70-2

## 2024-11-07 ASSESSMENT — CORNEAL EDEMA CLINICAL DESCRIPTION: OD_CORNEALEDEMA: T

## 2024-11-07 ASSESSMENT — CONFRONTATIONAL VISUAL FIELD TEST (CVF)
OS_FINDINGS: FULL
OD_FINDINGS: FULL